# Patient Record
Sex: FEMALE | Race: WHITE | Employment: FULL TIME | URBAN - NONMETROPOLITAN AREA
[De-identification: names, ages, dates, MRNs, and addresses within clinical notes are randomized per-mention and may not be internally consistent; named-entity substitution may affect disease eponyms.]

---

## 2014-11-12 LAB — DIABETIC RETINOPATHY: NORMAL

## 2016-08-18 LAB
CREATININE URINE: NORMAL MG/DL
MICROALBUMIN/CREAT 24H UR: NORMAL MG/G{CREAT}

## 2016-08-19 LAB
CHOLESTEROL, TOTAL: NORMAL MG/DL
CHOLESTEROL/HDL RATIO: NORMAL
HDLC SERPL-MCNC: NORMAL MG/DL (ref 35–70)
LDL CHOLESTEROL CALCULATED: NORMAL MG/DL (ref 0–160)
TRIGL SERPL-MCNC: NORMAL MG/DL
VLDLC SERPL CALC-MCNC: NORMAL MG/DL

## 2017-04-08 LAB
BUN BLDV-MCNC: NORMAL MG/DL
CHLORIDE: NORMAL
CHLORIDE: NORMAL
CREATININE: NORMAL MG/DL
GFR CALCULATED: NORMAL
HBA1C MFR BLD: 8.5 %
POTASSIUM: NORMAL
SODIUM: NORMAL

## 2017-05-04 ENCOUNTER — OFFICE VISIT (OUTPATIENT)
Dept: FAMILY MEDICINE CLINIC | Age: 62
End: 2017-05-04
Payer: COMMERCIAL

## 2017-05-04 VITALS
HEIGHT: 63 IN | BODY MASS INDEX: 32.07 KG/M2 | SYSTOLIC BLOOD PRESSURE: 116 MMHG | DIASTOLIC BLOOD PRESSURE: 58 MMHG | HEART RATE: 64 BPM | WEIGHT: 181 LBS

## 2017-05-04 DIAGNOSIS — I10 ESSENTIAL HYPERTENSION: ICD-10-CM

## 2017-05-04 DIAGNOSIS — E78.6 LIPOPROTEIN DEFICIENCY: ICD-10-CM

## 2017-05-04 DIAGNOSIS — S90.852A FOREIGN BODY IN FOOT, LEFT, INITIAL ENCOUNTER: Primary | ICD-10-CM

## 2017-05-04 DIAGNOSIS — E78.5 HYPERLIPIDEMIA, UNSPECIFIED HYPERLIPIDEMIA TYPE: ICD-10-CM

## 2017-05-04 DIAGNOSIS — N76.0 ACUTE VAGINITIS: ICD-10-CM

## 2017-05-04 PROBLEM — E11.9 DIABETES MELLITUS (HCC): Status: ACTIVE | Noted: 2017-05-04

## 2017-05-04 PROCEDURE — 10120 INC&RMVL FB SUBQ TISS SMPL: CPT | Performed by: FAMILY MEDICINE

## 2017-05-04 PROCEDURE — 99213 OFFICE O/P EST LOW 20 MIN: CPT | Performed by: FAMILY MEDICINE

## 2017-05-04 RX ORDER — SIMVASTATIN 20 MG
20 TABLET ORAL NIGHTLY
COMMUNITY
End: 2017-10-23 | Stop reason: SDUPTHER

## 2017-05-04 RX ORDER — CHOLECALCIFEROL (VITAMIN D3) 125 MCG
500 CAPSULE ORAL DAILY
COMMUNITY

## 2017-05-04 RX ORDER — LISINOPRIL 20 MG/1
20 TABLET ORAL DAILY
COMMUNITY
End: 2017-10-23 | Stop reason: SDUPTHER

## 2017-05-04 RX ORDER — M-VIT,TX,IRON,MINS/CALC/FOLIC 27MG-0.4MG
1 TABLET ORAL DAILY
COMMUNITY
End: 2021-06-03

## 2017-05-08 ENCOUNTER — OFFICE VISIT (OUTPATIENT)
Dept: FAMILY MEDICINE CLINIC | Age: 62
End: 2017-05-08
Payer: COMMERCIAL

## 2017-05-08 VITALS
SYSTOLIC BLOOD PRESSURE: 130 MMHG | DIASTOLIC BLOOD PRESSURE: 80 MMHG | HEIGHT: 63 IN | HEART RATE: 84 BPM | WEIGHT: 178 LBS | BODY MASS INDEX: 31.54 KG/M2

## 2017-05-08 DIAGNOSIS — Z01.419 WELL WOMAN EXAM: Primary | ICD-10-CM

## 2017-05-08 DIAGNOSIS — B37.31 YEAST VAGINITIS: ICD-10-CM

## 2017-05-08 DIAGNOSIS — Z12.39 SCREENING FOR BREAST CANCER: ICD-10-CM

## 2017-05-08 DIAGNOSIS — Z12.11 SCREENING FOR COLON CANCER: ICD-10-CM

## 2017-05-08 DIAGNOSIS — D49.9 NEOPLASM: ICD-10-CM

## 2017-05-08 DIAGNOSIS — L82.1 SEBORRHEIC KERATOSES: ICD-10-CM

## 2017-05-08 DIAGNOSIS — K64.9 HEMORRHOIDS, UNSPECIFIED HEMORRHOID TYPE: ICD-10-CM

## 2017-05-08 PROCEDURE — 99213 OFFICE O/P EST LOW 20 MIN: CPT | Performed by: FAMILY MEDICINE

## 2017-05-08 RX ORDER — FLUCONAZOLE 200 MG/1
200 TABLET ORAL DAILY
Qty: 12 TABLET | Refills: 0 | Status: SHIPPED | OUTPATIENT
Start: 2017-05-08 | End: 2020-11-02

## 2017-05-08 ASSESSMENT — ENCOUNTER SYMPTOMS
COLOR CHANGE: 0
ABDOMINAL PAIN: 0

## 2017-05-09 PROBLEM — L82.1 SEBORRHEIC KERATOSES: Status: ACTIVE | Noted: 2017-05-09

## 2017-06-15 ENCOUNTER — TELEPHONE (OUTPATIENT)
Dept: FAMILY MEDICINE CLINIC | Age: 62
End: 2017-06-15

## 2017-06-15 DIAGNOSIS — Z12.39 SCREENING FOR BREAST CANCER: Primary | ICD-10-CM

## 2017-10-23 RX ORDER — LISINOPRIL 20 MG/1
20 TABLET ORAL DAILY
Qty: 30 TABLET | Refills: 5 | Status: SHIPPED | OUTPATIENT
Start: 2017-10-23 | End: 2017-11-15 | Stop reason: SDUPTHER

## 2017-10-23 RX ORDER — SIMVASTATIN 20 MG
20 TABLET ORAL NIGHTLY
Qty: 30 TABLET | Refills: 5 | Status: SHIPPED | OUTPATIENT
Start: 2017-10-23 | End: 2017-11-15 | Stop reason: SDUPTHER

## 2017-11-11 LAB
AGE FOR GFR: 64
ANION GAP SERPL CALCULATED.3IONS-SCNC: 13 MMOL/L
CHLORIDE BLD-SCNC: 104 MMOL/L
CO2: 28 MMOL/L
CREAT SERPL-MCNC: 0.8 MG/DL
EGFR BF: 87 ML/MIN/1.73 M2
EGFR BM: 118 ML/MIN/1.73 M2
EGFR WF: 72 ML/MIN/1.73 M2
EGFR WM: 97 ML/MIN/1.73 M2
HBA1C MFR BLD: 8 %
POTASSIUM SERPL-SCNC: 4.2 MMOL/L
SODIUM BLD-SCNC: 141 MMOL/L

## 2017-11-15 ENCOUNTER — OFFICE VISIT (OUTPATIENT)
Dept: FAMILY MEDICINE CLINIC | Age: 62
End: 2017-11-15
Payer: COMMERCIAL

## 2017-11-15 VITALS
HEART RATE: 80 BPM | BODY MASS INDEX: 31.18 KG/M2 | WEIGHT: 176 LBS | DIASTOLIC BLOOD PRESSURE: 70 MMHG | SYSTOLIC BLOOD PRESSURE: 110 MMHG

## 2017-11-15 DIAGNOSIS — Z11.59 NEED FOR HEPATITIS C SCREENING TEST: ICD-10-CM

## 2017-11-15 DIAGNOSIS — E11.9 TYPE 2 DIABETES MELLITUS WITHOUT COMPLICATION, WITHOUT LONG-TERM CURRENT USE OF INSULIN (HCC): Primary | ICD-10-CM

## 2017-11-15 DIAGNOSIS — E78.2 MIXED HYPERLIPIDEMIA: ICD-10-CM

## 2017-11-15 DIAGNOSIS — I10 ESSENTIAL HYPERTENSION: ICD-10-CM

## 2017-11-15 PROCEDURE — 99214 OFFICE O/P EST MOD 30 MIN: CPT | Performed by: FAMILY MEDICINE

## 2017-11-15 RX ORDER — SIMVASTATIN 20 MG
20 TABLET ORAL NIGHTLY
Qty: 30 TABLET | Refills: 5 | Status: SHIPPED | OUTPATIENT
Start: 2017-11-15 | End: 2018-03-14 | Stop reason: SDUPTHER

## 2017-11-15 RX ORDER — ACARBOSE 50 MG/1
50 TABLET ORAL
Qty: 90 TABLET | Refills: 5 | Status: SHIPPED | OUTPATIENT
Start: 2017-11-15 | End: 2018-03-14 | Stop reason: SDUPTHER

## 2017-11-15 RX ORDER — LISINOPRIL 20 MG/1
20 TABLET ORAL DAILY
Qty: 30 TABLET | Refills: 5 | Status: SHIPPED | OUTPATIENT
Start: 2017-11-15 | End: 2018-03-14 | Stop reason: SDUPTHER

## 2017-11-15 ASSESSMENT — PATIENT HEALTH QUESTIONNAIRE - PHQ9
SUM OF ALL RESPONSES TO PHQ9 QUESTIONS 1 & 2: 1
1. LITTLE INTEREST OR PLEASURE IN DOING THINGS: 0
SUM OF ALL RESPONSES TO PHQ QUESTIONS 1-9: 1
2. FEELING DOWN, DEPRESSED OR HOPELESS: 1

## 2017-11-15 ASSESSMENT — ENCOUNTER SYMPTOMS: SHORTNESS OF BREATH: 0

## 2017-11-15 NOTE — PROGRESS NOTES
Take 1 tablet by mouth 3 times daily (with meals) 90 tablet 5    Cholecalciferol (VITAMIN D3) 5000 UNITS TABS Take 1 tablet by mouth daily      Multiple Vitamins-Minerals (THERAPEUTIC MULTIVITAMIN-MINERALS) tablet Take 1 tablet by mouth daily      vitamin B-12 (CYANOCOBALAMIN) 500 MCG tablet Take 500 mcg by mouth daily      aspirin 81 MG tablet Take 81 mg by mouth daily       No current facility-administered medications for this visit. Allergies   Allergen Reactions    Advil [Ibuprofen] Nausea Only       Health Maintenance   Topic Date Due    Hepatitis C screen  1955    Diabetic foot exam  05/12/1963    HIV screen  05/12/1968    Colon cancer screen colonoscopy  05/12/2003    Zostavax vaccine  05/12/2013    Diabetic retinal exam  11/12/2015    Diabetic microalbuminuria test  08/18/2017    Lipid screen  08/19/2017    Breast cancer screen  06/21/2018    Diabetic hemoglobin A1C test  11/11/2018    DTaP/Tdap/Td vaccine (2 - Td) 03/15/2020    Cervical cancer screen  05/08/2020    Flu vaccine  Completed    Pneumococcal med risk  Completed       Subjective:      Review of Systems   Constitutional: Negative for chills and unexpected weight change. Eyes: Negative for visual disturbance. Respiratory: Negative for shortness of breath. Cardiovascular: Negative for chest pain, palpitations and leg swelling. Genitourinary: Negative for frequency. Neurological: Negative for dizziness. reviewed colon cancer screening    Blood Sugar Checks? no  Medication Compliant? yes  Blood Pressure Checks? no    Objective:     /70   Pulse 80   Wt 176 lb (79.8 kg)   BMI 31.18 kg/m²   Physical Exam   Constitutional: She is oriented to person, place, and time. She appears well-developed and well-nourished. No distress. HENT:   Head: Atraumatic. Neck: Neck supple. Carotid bruit is not present. No thyromegaly present. Cardiovascular: Normal rate and regular rhythm.     No murmur heard.  Pulmonary/Chest: Effort normal and breath sounds normal.   Neurological: She is alert and oriented to person, place, and time. Lab Results   Component Value Date    LABA1C 8.0 (H) 11/11/2017     No results found for: EAG  Diabetic foot exam - normal strength; intact sensation to 10 gm monofilament; normal pulses, no ulcerations or callouses. Good capillary refill. Toenails unremarkable. Down 2 # , ( plans for more when in [facility])  Assessment:      1. Type 2 diabetes mellitus without complication, without long-term current use of insulin ([address]    2. Essential hypertension    3. Mixed hyperlipidemia    4. Need for hepatitis C screening test                     Plan:     There are no Patient Instructions on file for this visit.   Orders Placed This Encounter   Procedures    Lipid Panel     Standing Status:   Future     Standing Expiration Date:   11/15/2018     Order Specific Question:   Is Patient Fasting?/# of Hours     Answer:   10-12    Creatinine, Serum     Standing Status:   Future     Standing Expiration Date:   11/15/2018    Electrolyte Panel     Standing Status:   Future     Standing Expiration Date:   11/15/2018    Hepatitis C Antibody     Standing Status:   Future     Standing Expiration Date:   11/15/2018    Microalbumin, Ur     Standing Status:   Future     Standing Expiration Date:   11/15/2018     Orders Placed This Encounter   Medications    SITagliptin (JANUVIA) 100 MG tablet     Sig: Take 1 tablet by mouth daily     Dispense:  30 tablet     Refill:  5    simvastatin (ZOCOR) 20 MG tablet     Sig: Take 1 tablet by mouth nightly     Dispense:  30 tablet     Refill:  5    metFORMIN (GLUCOPHAGE) 500 MG tablet     Sig: Take 2 tablets by mouth 2 times daily (with meals)     Dispense:  120 tablet     Refill:  5    lisinopril (PRINIVIL;ZESTRIL) 20 MG tablet     Sig: Take 1 tablet by mouth daily     Dispense:  30 tablet     Refill:  5    dapagliflozin (FARXIGA) 10 MG tablet     Sig: Take 1 tablet by mouth every morning     Dispense:  30 tablet     Refill:  5    acarbose (PRECOSE) 50 MG tablet     Sig: Take 1 tablet by mouth 3 times daily (with meals)     Dispense:  90 tablet     Refill:  5        Return in about 4 months (around 3/15/2018) for DM. Insurance would not cover bydureon for spouse, may consider at follow up          Discussed use, benefit, and side effects of prescribed medications. All patient questions answered. Pt voiced understanding. Reviewed health maintenance. Instructed to continue current medications, diet and exercise. Patient agreed with treatment plan. Follow up as directed.      Electronically signed by Deena Jacome MD on 11/15/2017

## 2018-03-10 LAB
AGE FOR GFR: 64
ANION GAP SERPL CALCULATED.3IONS-SCNC: 19 MMOL/L
CHLORIDE BLD-SCNC: 103 MMOL/L
CHOLESTEROL/HDL RATIO: 3 RATIO
CHOLESTEROL: 176 MG/DL
CO2: 25 MMOL/L
CREAT SERPL-MCNC: 0.8 MG/DL
CREATININE, RANDOM: 30.9 MG/DL
EGFR BF: 87 ML/MIN/1.73 M2
EGFR BM: 118 ML/MIN/1.73 M2
EGFR WF: 72 ML/MIN/1.73 M2
EGFR WM: 97 ML/MIN/1.73 M2
HDL, DIRECT: 58 MG/DL
HEPATITIS C IGG: NORMAL
LDL CHOLESTEROL CALCULATED: 87.8 MG/DL
MICROALBUMIN UR-MCNC: <0.6 MG/DL
POTASSIUM SERPL-SCNC: 4.5 MMOL/L
SIGNAL/CUTOFF: NORMAL
SODIUM BLD-SCNC: 142 MMOL/L
TRIGL SERPL-MCNC: 151 MG/DL
VLDLC SERPL CALC-MCNC: 30 MG/DL

## 2018-03-14 ENCOUNTER — OFFICE VISIT (OUTPATIENT)
Dept: FAMILY MEDICINE CLINIC | Age: 63
End: 2018-03-14
Payer: COMMERCIAL

## 2018-03-14 VITALS
HEIGHT: 63 IN | DIASTOLIC BLOOD PRESSURE: 72 MMHG | SYSTOLIC BLOOD PRESSURE: 138 MMHG | WEIGHT: 179 LBS | BODY MASS INDEX: 31.71 KG/M2 | HEART RATE: 88 BPM

## 2018-03-14 DIAGNOSIS — I10 ESSENTIAL HYPERTENSION: ICD-10-CM

## 2018-03-14 DIAGNOSIS — Z23 NEED FOR SHINGLES VACCINE: ICD-10-CM

## 2018-03-14 DIAGNOSIS — E78.2 MIXED HYPERLIPIDEMIA: ICD-10-CM

## 2018-03-14 DIAGNOSIS — Z12.11 SCREENING FOR COLON CANCER: ICD-10-CM

## 2018-03-14 LAB — HBA1C MFR BLD: 7.4 %

## 2018-03-14 PROCEDURE — 99214 OFFICE O/P EST MOD 30 MIN: CPT | Performed by: FAMILY MEDICINE

## 2018-03-14 PROCEDURE — 83036 HEMOGLOBIN GLYCOSYLATED A1C: CPT | Performed by: FAMILY MEDICINE

## 2018-03-14 RX ORDER — ACARBOSE 50 MG/1
50 TABLET ORAL
Qty: 90 TABLET | Refills: 5 | Status: SHIPPED | OUTPATIENT
Start: 2018-03-14 | End: 2018-10-10 | Stop reason: SDUPTHER

## 2018-03-14 RX ORDER — SIMVASTATIN 20 MG
20 TABLET ORAL NIGHTLY
Qty: 30 TABLET | Refills: 5 | Status: SHIPPED | OUTPATIENT
Start: 2018-03-14 | End: 2018-09-25 | Stop reason: SDUPTHER

## 2018-03-14 RX ORDER — LISINOPRIL 20 MG/1
30 TABLET ORAL DAILY
Qty: 45 TABLET | Refills: 5 | Status: SHIPPED | OUTPATIENT
Start: 2018-03-14 | End: 2018-10-10 | Stop reason: SDUPTHER

## 2018-03-14 ASSESSMENT — ENCOUNTER SYMPTOMS: SHORTNESS OF BREATH: 0

## 2018-03-14 NOTE — PROGRESS NOTES
[facility]  [address]  Dept: [phone]  Dept Fax: [phone]    Cody Castelan is a 57 y.o. female who presents today for her medical conditions/complaints as noted below. Cody Castelan is c/o of 3 Month Follow-Up and Diabetes            HPI:     Diabetes   She presents for her follow-up diabetic visit. She has type 2 diabetes mellitus. Her disease course has been improving. Pertinent negatives for hypoglycemia include no dizziness. Pertinent negatives for diabetes include no chest pain. Current diabetic treatment includes oral agent (triple therapy). She is compliant with treatment all of the time. An ACE inhibitor/angiotensin II receptor blocker is being taken. Here to follow up on DM, not monitoring to closely  Taking medication regularly.   No side effects noted    BP Readings from Last 3 Encounters:   03/14/18 138/72   11/15/17 110/70   05/08/17 130/80          (goal 120/80)    Past Medical History:   Diagnosis Date    Hyperlipidemia     Hypertension     Type 2 diabetes mellitus without complication (HCC)       Past Surgical History:   Procedure Laterality Date    CYST REMOVAL      spinal cyst    KNEE SURGERY Right 02/2017    TUBAL LIGATION         Family History   Problem Relation Age of Onset    Diabetes Mother     Breast Cancer Mother     Cancer Mother 66     breast- , BCC lip-70    High Blood Pressure Mother     Diabetes Father     Heart Attack Father     Heart Attack Paternal Grandmother        Social History   Substance Use Topics    Smoking status: Former Smoker    Smokeless tobacco: Never Used    Alcohol use Not on file      Current Outpatient Prescriptions   Medication Sig Dispense Refill    acarbose (PRECOSE) 50 MG tablet Take 1 tablet by mouth 3 times daily (with meals) 90 tablet 5    dapagliflozin (FARXIGA) 10 MG tablet Take 1 tablet by mouth every morning 30 tablet 5    lisinopril (PRINIVIL;ZESTRIL) 20 Genitourinary: Negative for frequency. Neurological: Negative for dizziness. Blood Sugar Checks? no  Medication Compliant? yes  Blood Pressure Checks? No just on occassion    Objective:     /72   Pulse 88   Ht 5' 3\" (1.6 m)   Wt 179 lb (81.2 kg)   BMI 31.71 kg/m²   Physical Exam   Constitutional: She is oriented to person, place, and time. She appears well-developed and well-nourished. No distress. HENT:   Head: Atraumatic. Neck: Neck supple. Carotid bruit is not present. No thyromegaly present. Cardiovascular: Normal rate and regular rhythm. No murmur heard. Pulmonary/Chest: Effort normal and breath sounds normal.   Neurological: She is alert and oriented to person, place, and time. Lab Results   Component Value Date    LABA1C 7.4 03/14/2018     No results found for: EAG  Wt up 2 #   Reviewed Hep C negative  Lab Results   Component Value Date    CHOL 176 03/10/2018     Lab Results   Component Value Date    TRIG 151 03/10/2018     No results found for: HDL  Lab Results   Component Value Date    LDLCALC 87.8 03/10/2018     Lab Results   Component Value Date    VLDL 30 03/10/2018     Lab Results   Component Value Date    CHOLHDLRATIO 3.0 03/10/2018     The 10-year ASCVD risk score (Aston Gunn et al., 2013) is: 13.2%    Values used to calculate the score:      Age: 57 years      Sex: Female      Is Non- : No      Diabetic: Yes      Tobacco smoker: No      Systolic Blood Pressure: 335 mmHg      Is BP treated: Yes      HDL Cholesterol: 58 mg/dL      Total Cholesterol: 176 mg/dL  Lab Results   Component Value Date    LABA1C 7.4 03/14/2018     No results found for: EAG    Assessment:      1. Uncontrolled type 2 diabetes mellitus without complication, without long-term current use of insulin ([address]    2. Screening for colon cancer    3. Need for shingles vaccine    4. Essential hypertension    5.  Mixed hyperlipidemia                     Plan:     There are no Patient Instructions on file for this visit. Orders Placed This Encounter   Procedures    Amb External Referral To General Surgery     Referral Priority:   Routine     Referral Type:   Consult for Advice and Opinion     Referral Reason:   Specialty Services Required     Referred to Provider:   Shayan Bee MD     Requested Specialty:   General Surgery     Number of Visits Requested:   1    POCT glycosylated hemoglobin (Hb A1C)     Orders Placed This Encounter   Medications    acarbose (PRECOSE) 50 MG tablet     Sig: Take 1 tablet by mouth 3 times daily (with meals)     Dispense:  90 tablet     Refill:  5    dapagliflozin (FARXIGA) 10 MG tablet     Sig: Take 1 tablet by mouth every morning     Dispense:  30 tablet     Refill:  5    lisinopril (PRINIVIL;ZESTRIL) 20 MG tablet     Sig: Take 1.5 tablets by mouth daily     Dispense:  45 tablet     Refill:  5    metFORMIN (GLUCOPHAGE) 500 MG tablet     Sig: Take 2 tablets by mouth 2 times daily (with meals)     Dispense:  120 tablet     Refill:  5    simvastatin (ZOCOR) 20 MG tablet     Sig: Take 1 tablet by mouth nightly     Dispense:  30 tablet     Refill:  5    SITagliptin (JANUVIA) 100 MG tablet     Sig: Take 1 tablet by mouth daily     Dispense:  30 tablet     Refill:  5    zoster recombinant adjuvanted vaccine (SHINGRIX) 50 MCG SUSR injection     Sig: Inject 0.5 mLs into the muscle once for 1 dose     Dispense:  1 each     Refill:  0        Return in about 4 months (around 7/14/2018). Need for optho appt. Discussed use, benefit, and side effects of prescribed medications. All patient questions answered. Pt voiced understanding. Reviewed health maintenance reviewed shingrix, colonoscopy desired. Instructed to continue current medications, diet and exercise. Patient agreed with treatment plan. Follow up as directed.      Electronically signed by Yanci Peralta MD on 3/14/2018

## 2018-09-12 ENCOUNTER — TELEPHONE (OUTPATIENT)
Dept: FAMILY MEDICINE CLINIC | Age: 63
End: 2018-09-12

## 2018-09-17 ENCOUNTER — OFFICE VISIT (OUTPATIENT)
Dept: FAMILY MEDICINE CLINIC | Age: 63
End: 2018-09-17
Payer: MEDICARE

## 2018-09-17 VITALS
BODY MASS INDEX: 31.18 KG/M2 | WEIGHT: 176 LBS | DIASTOLIC BLOOD PRESSURE: 90 MMHG | HEART RATE: 80 BPM | SYSTOLIC BLOOD PRESSURE: 130 MMHG

## 2018-09-17 DIAGNOSIS — E11.9 TYPE 2 DIABETES MELLITUS WITHOUT COMPLICATION, WITHOUT LONG-TERM CURRENT USE OF INSULIN (HCC): Primary | ICD-10-CM

## 2018-09-17 DIAGNOSIS — Z12.11 COLON CANCER SCREENING: ICD-10-CM

## 2018-09-17 DIAGNOSIS — Z23 NEED FOR VACCINATION: ICD-10-CM

## 2018-09-17 DIAGNOSIS — E78.6 LIPOPROTEIN DEFICIENCY: ICD-10-CM

## 2018-09-17 DIAGNOSIS — E78.2 MIXED HYPERLIPIDEMIA: ICD-10-CM

## 2018-09-17 DIAGNOSIS — Z23 NEED FOR PNEUMOCOCCAL VACCINATION: ICD-10-CM

## 2018-09-17 DIAGNOSIS — I10 ESSENTIAL HYPERTENSION: ICD-10-CM

## 2018-09-17 LAB — HBA1C MFR BLD: 8.6 %

## 2018-09-17 PROCEDURE — G0009 ADMIN PNEUMOCOCCAL VACCINE: HCPCS | Performed by: FAMILY MEDICINE

## 2018-09-17 PROCEDURE — 99214 OFFICE O/P EST MOD 30 MIN: CPT | Performed by: FAMILY MEDICINE

## 2018-09-17 PROCEDURE — 1123F ACP DISCUSS/DSCN MKR DOCD: CPT | Performed by: FAMILY MEDICINE

## 2018-09-17 PROCEDURE — G8427 DOCREV CUR MEDS BY ELIG CLIN: HCPCS | Performed by: FAMILY MEDICINE

## 2018-09-17 PROCEDURE — 4040F PNEUMOC VAC/ADMIN/RCVD: CPT | Performed by: FAMILY MEDICINE

## 2018-09-17 PROCEDURE — 1090F PRES/ABSN URINE INCON ASSESS: CPT | Performed by: FAMILY MEDICINE

## 2018-09-17 PROCEDURE — 3045F PR MOST RECENT HEMOGLOBIN A1C LEVEL 7.0-9.0%: CPT | Performed by: FAMILY MEDICINE

## 2018-09-17 PROCEDURE — 1036F TOBACCO NON-USER: CPT | Performed by: FAMILY MEDICINE

## 2018-09-17 PROCEDURE — 3017F COLORECTAL CA SCREEN DOC REV: CPT | Performed by: FAMILY MEDICINE

## 2018-09-17 PROCEDURE — 83036 HEMOGLOBIN GLYCOSYLATED A1C: CPT | Performed by: FAMILY MEDICINE

## 2018-09-17 PROCEDURE — 90662 IIV NO PRSV INCREASED AG IM: CPT | Performed by: FAMILY MEDICINE

## 2018-09-17 PROCEDURE — G0008 ADMIN INFLUENZA VIRUS VAC: HCPCS | Performed by: FAMILY MEDICINE

## 2018-09-17 PROCEDURE — 1101F PT FALLS ASSESS-DOCD LE1/YR: CPT | Performed by: FAMILY MEDICINE

## 2018-09-17 PROCEDURE — 2022F DILAT RTA XM EVC RTNOPTHY: CPT | Performed by: FAMILY MEDICINE

## 2018-09-17 PROCEDURE — G8417 CALC BMI ABV UP PARAM F/U: HCPCS | Performed by: FAMILY MEDICINE

## 2018-09-17 PROCEDURE — G8400 PT W/DXA NO RESULTS DOC: HCPCS | Performed by: FAMILY MEDICINE

## 2018-09-17 PROCEDURE — 90670 PCV13 VACCINE IM: CPT | Performed by: FAMILY MEDICINE

## 2018-09-17 ASSESSMENT — PATIENT HEALTH QUESTIONNAIRE - PHQ9
1. LITTLE INTEREST OR PLEASURE IN DOING THINGS: 0
SUM OF ALL RESPONSES TO PHQ9 QUESTIONS 1 & 2: 0
2. FEELING DOWN, DEPRESSED OR HOPELESS: 0
SUM OF ALL RESPONSES TO PHQ QUESTIONS 1-9: 0
SUM OF ALL RESPONSES TO PHQ QUESTIONS 1-9: 0

## 2018-09-17 ASSESSMENT — ENCOUNTER SYMPTOMS: SHORTNESS OF BREATH: 0

## 2018-09-17 NOTE — PROGRESS NOTES
tablet Take 1.5 tablets by mouth daily 45 tablet 5    simvastatin (ZOCOR) 20 MG tablet Take 1 tablet by mouth nightly 30 tablet 5    Doxylamine Succinate, Sleep, (SLEEP AID PO) Take 1 tablet by mouth nightly as needed      Cholecalciferol (VITAMIN D3) 5000 UNITS TABS Take 1 tablet by mouth daily      Multiple Vitamins-Minerals (THERAPEUTIC MULTIVITAMIN-MINERALS) tablet Take 1 tablet by mouth daily      vitamin B-12 (CYANOCOBALAMIN) 500 MCG tablet Take 500 mcg by mouth daily      aspirin 81 MG tablet Take 81 mg by mouth daily       No current facility-administered medications for this visit. Allergies   Allergen Reactions    Advil [Ibuprofen] Nausea Only       Health Maintenance   Topic Date Due    HIV screen  05/12/1968    Shingles Vaccine (1 of 2 - 2 Dose Series) 05/12/2003    Colon cancer screen colonoscopy  05/12/2003    Diabetic retinal exam  11/12/2015    DEXA (modify frequency per FRAX score)  05/12/2018    Breast cancer screen  06/21/2018    Diabetic foot exam  11/15/2018    Diabetic microalbuminuria test  03/10/2019    Lipid screen  03/10/2019    Potassium monitoring  03/10/2019    Creatinine monitoring  03/10/2019    A1C test (Diabetic or Prediabetic)  09/17/2019    Pneumococcal low/med risk (2 of 2 - PPSV23) 09/17/2019    DTaP/Tdap/Td vaccine (2 - Td) 03/15/2020    Cervical cancer screen  05/08/2020    Flu vaccine  Completed    Hepatitis C screen  Completed       Subjective:      Review of Systems   Constitutional: Negative for chills and unexpected weight change. Eyes: Positive for visual disturbance (going to eye phys). Respiratory: Negative for shortness of breath. Cardiovascular: Negative for chest pain, palpitations and leg swelling. Genitourinary: Negative for frequency. Neurological: Negative for dizziness. Blood Sugar Checks? Yes AM is always high 165-170  Medication Compliant? yes  Blood Pressure Checks?  Yes at times    Objective:     BP (!) 130/90 Pulse 80   Wt 176 lb (79.8 kg)   BMI 31.18 kg/m²   Physical Exam   Constitutional: She is oriented to person, place, and time. She appears well-developed and well-nourished. No distress. HENT:   Head: Atraumatic. Neck: Neck supple. Carotid bruit is not present. No thyromegaly present. Cardiovascular: Normal rate and regular rhythm. No murmur heard. Pulmonary/Chest: Effort normal and breath sounds normal.   Musculoskeletal: She exhibits no edema. Neurological: She is alert and oriented to person, place, and time. Lab Results   Component Value Date    LABA1C 8.6 09/17/2018     No results found for: EAG  A1C 8.6 up from 7.4 when on both januvia and farxiga      Assessment:      1. Type 2 diabetes mellitus without complication, without long-term current use of insulin ([facility] [address]    2. Need for vaccination    3. Need for pneumococcal vaccination    4. Essential hypertension    5. Mixed hyperlipidemia    6. Lipoprotein deficiency    7. Colon cancer screening                     Plan:     Patient Instructions   Should be using farxiga and either tradjenta or [place] for sugar control    Orders Placed This Encounter   Procedures    INFLUENZA, HIGH DOSE, 65 YRS +, IM, PF, PREFILL SYR, 0.5ML (FLUZONE HD)    Pneumococcal conjugate vaccine 13-valent    Flor Davila DO, General Surgery [place]     Referral Priority:   Routine     Referral Type:   Eval and Treat     Referral Reason:   Specialty Services Required     Referred to Provider:   Jason Claire DO     Requested Specialty:   General Surgery     Number of Visits Requested:   1    POCT glycosylated hemoglobin (Hb A1C)     Orders Placed This Encounter   Medications    linagliptin (TRADJENTA) 5 MG tablet     Sig: Take 1 tablet by mouth daily     Dispense:  30 tablet     Refill:  5        Return in about 4 months (around 1/17/2019) for DM, HTN.     Pt reports plans to see optho prior to November          Discussed use, benefit, and side effects of prescribed medications. All patient questions answered. Pt voiced understanding. Reviewed health maintenance recommend prevnar -13 reviewed with pt. SHingrix reviewed . Reviewed shingrix and colon cancer screening- referral made. Instructed to continue current medications, diet and exercise. Patient agreed with treatment plan. Follow up as directed.      Electronically signed by Homer Gray MD on 9/17/2018

## 2018-09-25 RX ORDER — SIMVASTATIN 20 MG
20 TABLET ORAL NIGHTLY
Qty: 90 TABLET | Refills: 2 | Status: SHIPPED | OUTPATIENT
Start: 2018-09-25 | End: 2018-10-10 | Stop reason: SDUPTHER

## 2018-09-27 ENCOUNTER — OFFICE VISIT (OUTPATIENT)
Dept: SURGERY | Age: 63
End: 2018-09-27

## 2018-09-27 ENCOUNTER — TELEPHONE (OUTPATIENT)
Dept: SURGERY | Age: 63
End: 2018-09-27

## 2018-09-27 VITALS
SYSTOLIC BLOOD PRESSURE: 120 MMHG | BODY MASS INDEX: 31.43 KG/M2 | HEIGHT: 63 IN | WEIGHT: 177.4 LBS | DIASTOLIC BLOOD PRESSURE: 84 MMHG | HEART RATE: 79 BPM | TEMPERATURE: 98.1 F

## 2018-09-27 DIAGNOSIS — Z12.11 SCREENING FOR COLON CANCER: Primary | ICD-10-CM

## 2018-09-27 PROCEDURE — 99999 PR OFFICE/OUTPT VISIT,PROCEDURE ONLY: CPT | Performed by: SURGERY

## 2018-09-27 PROCEDURE — 1123F ACP DISCUSS/DSCN MKR DOCD: CPT | Performed by: SURGERY

## 2018-09-27 PROCEDURE — 1036F TOBACCO NON-USER: CPT | Performed by: SURGERY

## 2018-09-27 PROCEDURE — 3017F COLORECTAL CA SCREEN DOC REV: CPT | Performed by: SURGERY

## 2018-09-27 PROCEDURE — 1101F PT FALLS ASSESS-DOCD LE1/YR: CPT | Performed by: SURGERY

## 2018-09-27 PROCEDURE — G8400 PT W/DXA NO RESULTS DOC: HCPCS | Performed by: SURGERY

## 2018-09-27 PROCEDURE — G8417 CALC BMI ABV UP PARAM F/U: HCPCS | Performed by: SURGERY

## 2018-09-27 PROCEDURE — G8427 DOCREV CUR MEDS BY ELIG CLIN: HCPCS | Performed by: SURGERY

## 2018-09-27 PROCEDURE — 4040F PNEUMOC VAC/ADMIN/RCVD: CPT | Performed by: SURGERY

## 2018-09-27 PROCEDURE — 1090F PRES/ABSN URINE INCON ASSESS: CPT | Performed by: SURGERY

## 2018-09-27 NOTE — PATIENT INSTRUCTIONS
Patient Education        Learning About Colonoscopy  What is a colonoscopy? A colonoscopy is a test (also called a procedure) that lets a doctor look inside your large intestine. The doctor uses a thin, lighted tube called a colonoscope. The doctor uses it to look for small growths called polyps, colon or rectal cancer (colorectal cancer), or other problems like bleeding. During the procedure, the doctor can take samples of tissue. The samples can then be checked for cancer or other conditions. The doctor can also take out polyps. How is colonoscopy done? This procedure is done in a doctor's office or a clinic or hospital. You will get medicine to help you relax and not feel pain. Some people find that they do not remember having the test because of the medicine. The doctor gently moves the colonoscope, or scope, through the colon. The scope is also a small video camera. It lets the doctor see the colon and take pictures. A colonoscopy usually takes 30 to 45 minutes. It may take longer if the doctor has to remove polyps. How do you prepare for the procedure? You need to clean out your colon before the procedure so the doctor can see all of your colon. You may start the cleaning process a day or two before the test. This depends on which \"colon prep\" your doctor recommends. To clean your colon, you stop eating solid foods and drink only clear liquids. You can have water, tea, coffee, clear juices, clear broths, flavored ice pops, and gelatin (such as Jell-O). Do not drink anything red or purple, such as grape juice or fruit punch. And do not eat red or purple foods, such as grape ice pops or cherry gelatin. The day or night before the procedure, you drink a large amount of a special liquid. This causes loose, frequent stools. You will go to the bathroom a lot. It is very important to drink all of the colon prep liquid. If you have problems drinking the liquid, call your doctor.   For many people, the prep is worse than the test. It may be uncomfortable, and you may feel hungry on the clear liquid diet. Some people do not go to work or do their usual activities on the day of the prep. Arrange to have someone take you home after the test.  What can you expect after a colonoscopy? The nurses will watch you for 1 to 2 hours until the medicines wear off. Then you can go home. You will need a ride. Your doctor will tell you when you can eat and do your usual activities. Your doctor will talk to you about when you will need your next colonoscopy. The results of your test and your risk for colorectal cancer will help your doctor decide how often you need to be checked. Follow-up care is a key part of your treatment and safety. Be sure to make and go to all appointments, and call your doctor if you are having problems. It's also a good idea to know your test results and keep a list of the medicines you take. Where can you learn more? Go to https://"[employer]. org and sign in to your [employer] account. Enter E250 in the [employer] box to learn more about \"Learning About Colonoscopy. \"     If you do not have an account, please click on the \"Sign Up Now\" link. Current as of: May 12, 2017  Content Version: 11.7  © 0210-8638 [employer]. Care instructions adapted under license by [facility] ([facility]). If you have questions about a medical condition or this instruction, always ask your healthcare professional. [place] [address] any warranty or liability for your use of this information.

## 2018-09-27 NOTE — PROGRESS NOTES
Colonoscopy:                   Screen-Yes     Diagnostic-No  Abdominal Pain-No              Melena-No  Anemia-No                           Hematochezia-No  Bloating-No                          Rectal Bleeding-No  Diarrhea-No                         Rectal/Anal Pain-No  Constipation-No                   Pruritis-No  Family History colon cancer-No  Previous Colon Cancer/Polyp-No  Change in Bowels-No  Decrease Caliber of Stool-No  Change in Color of Stool-No

## 2018-10-01 LAB — PATHOLOGY REPORT: NORMAL

## 2018-10-10 RX ORDER — SIMVASTATIN 20 MG
20 TABLET ORAL NIGHTLY
Qty: 90 TABLET | Refills: 1 | Status: SHIPPED | OUTPATIENT
Start: 2018-10-10 | End: 2019-02-08 | Stop reason: SDUPTHER

## 2018-10-10 RX ORDER — ACARBOSE 50 MG/1
50 TABLET ORAL
Qty: 270 TABLET | Refills: 1 | Status: SHIPPED | OUTPATIENT
Start: 2018-10-10 | End: 2019-02-08 | Stop reason: SDUPTHER

## 2018-10-10 RX ORDER — LISINOPRIL 20 MG/1
30 TABLET ORAL DAILY
Qty: 135 TABLET | Refills: 1 | Status: SHIPPED | OUTPATIENT
Start: 2018-10-10 | End: 2019-02-08 | Stop reason: SDUPTHER

## 2018-10-24 ENCOUNTER — TELEPHONE (OUTPATIENT)
Dept: FAMILY MEDICINE CLINIC | Age: 63
End: 2018-10-24

## 2018-10-24 NOTE — TELEPHONE ENCOUNTER
Pt is taking Maricel [place] and is experiencing yeast infections with it. She would like to know if there is anything that could be called in to help this? Her phone number is [phone].

## 2018-11-20 NOTE — TELEPHONE ENCOUNTER
Cody Zepeda is calling to request a refill on the following medication(s):  Requested Prescriptions     Pending Prescriptions Disp Refills    Exenatide (BYDUREON) 2 MG PEN 12 pen 5     Sig: Inject 1 pen into the skin once a week       Last Visit Date (If Applicable):  3/05/2190    Next Visit Date:    Visit date not found    Fax states same or cheaper for 90 day supply

## 2018-12-04 ENCOUNTER — TELEPHONE (OUTPATIENT)
Dept: FAMILY MEDICINE CLINIC | Age: 63
End: 2018-12-04

## 2019-02-06 ENCOUNTER — TELEPHONE (OUTPATIENT)
Dept: FAMILY MEDICINE CLINIC | Age: 64
End: 2019-02-06

## 2019-02-08 RX ORDER — SIMVASTATIN 20 MG
20 TABLET ORAL NIGHTLY
Qty: 90 TABLET | Refills: 1 | Status: SHIPPED | OUTPATIENT
Start: 2019-02-08 | End: 2019-03-07 | Stop reason: SDUPTHER

## 2019-02-08 RX ORDER — LISINOPRIL 20 MG/1
30 TABLET ORAL DAILY
Qty: 135 TABLET | Refills: 1 | Status: SHIPPED | OUTPATIENT
Start: 2019-02-08 | End: 2019-03-07 | Stop reason: SDUPTHER

## 2019-02-08 RX ORDER — ACARBOSE 50 MG/1
50 TABLET ORAL
Qty: 270 TABLET | Refills: 1 | Status: SHIPPED | OUTPATIENT
Start: 2019-02-08 | End: 2019-03-07 | Stop reason: SDUPTHER

## 2019-03-07 RX ORDER — LISINOPRIL 20 MG/1
30 TABLET ORAL DAILY
Qty: 135 TABLET | Refills: 0 | Status: SHIPPED | OUTPATIENT
Start: 2019-03-07 | End: 2019-04-09 | Stop reason: SDUPTHER

## 2019-03-07 RX ORDER — SIMVASTATIN 20 MG
20 TABLET ORAL NIGHTLY
Qty: 90 TABLET | Refills: 0 | Status: SHIPPED | OUTPATIENT
Start: 2019-03-07 | End: 2019-04-29 | Stop reason: SDUPTHER

## 2019-03-07 RX ORDER — ACARBOSE 50 MG/1
50 TABLET ORAL
Qty: 270 TABLET | Refills: 0 | Status: SHIPPED | OUTPATIENT
Start: 2019-03-07 | End: 2019-04-29 | Stop reason: SDUPTHER

## 2019-03-27 ENCOUNTER — TELEPHONE (OUTPATIENT)
Dept: FAMILY MEDICINE CLINIC | Age: 64
End: 2019-03-27

## 2019-04-01 NOTE — TELEPHONE ENCOUNTER
Please ensure He is also taking her Trulicity. It appears we need a follow up appointment to discuss other treatment options including possible basal insulin treatment, if the Trulicity is being used also.   Thanks

## 2019-04-03 NOTE — TELEPHONE ENCOUNTER
Pt called back josh she is not taking the trulicty because it leaves bumps after she takes it so she is not taking it and said she was doing fine on the 100mg of januvia without trulicity. Pt is in [place] and made an apt for 4/29/19.  We can call her back at [phone]

## 2019-04-10 RX ORDER — LISINOPRIL 20 MG/1
30 TABLET ORAL DAILY
Qty: 135 TABLET | Refills: 0 | Status: SHIPPED | OUTPATIENT
Start: 2019-04-10 | End: 2019-11-04 | Stop reason: SDUPTHER

## 2019-04-10 NOTE — TELEPHONE ENCOUNTER
Arthur  is calling to request a refill on the following medication(s):  Requested Prescriptions     Pending Prescriptions Disp Refills    lisinopril (PRINIVIL;ZESTRIL) 20 MG tablet [Pharmacy Med Name: Lisinopril 20 MG Oral Tablet] 135 tablet 0     Sig: Take 1.5 tablets by mouth daily       Last Visit Date (If Applicable):  8/31/0768    Next Visit Date:    4/29/2019

## 2019-04-29 ENCOUNTER — OFFICE VISIT (OUTPATIENT)
Dept: FAMILY MEDICINE CLINIC | Age: 64
End: 2019-04-29
Payer: MEDICARE

## 2019-04-29 VITALS
SYSTOLIC BLOOD PRESSURE: 136 MMHG | WEIGHT: 176 LBS | OXYGEN SATURATION: 98 % | HEIGHT: 63 IN | DIASTOLIC BLOOD PRESSURE: 72 MMHG | BODY MASS INDEX: 31.18 KG/M2 | HEART RATE: 72 BPM

## 2019-04-29 DIAGNOSIS — I10 ESSENTIAL HYPERTENSION: ICD-10-CM

## 2019-04-29 DIAGNOSIS — Z78.0 POSTMENOPAUSAL: ICD-10-CM

## 2019-04-29 DIAGNOSIS — E78.2 MIXED HYPERLIPIDEMIA: ICD-10-CM

## 2019-04-29 DIAGNOSIS — E11.9 TYPE 2 DIABETES MELLITUS WITHOUT COMPLICATION, WITHOUT LONG-TERM CURRENT USE OF INSULIN (HCC): ICD-10-CM

## 2019-04-29 DIAGNOSIS — Z12.31 SCREENING MAMMOGRAM, ENCOUNTER FOR: ICD-10-CM

## 2019-04-29 LAB — HBA1C MFR BLD: 7.4 %

## 2019-04-29 PROCEDURE — 99214 OFFICE O/P EST MOD 30 MIN: CPT | Performed by: FAMILY MEDICINE

## 2019-04-29 PROCEDURE — 3017F COLORECTAL CA SCREEN DOC REV: CPT | Performed by: FAMILY MEDICINE

## 2019-04-29 PROCEDURE — 4040F PNEUMOC VAC/ADMIN/RCVD: CPT | Performed by: FAMILY MEDICINE

## 2019-04-29 PROCEDURE — 1036F TOBACCO NON-USER: CPT | Performed by: FAMILY MEDICINE

## 2019-04-29 PROCEDURE — 83036 HEMOGLOBIN GLYCOSYLATED A1C: CPT | Performed by: FAMILY MEDICINE

## 2019-04-29 PROCEDURE — 2022F DILAT RTA XM EVC RTNOPTHY: CPT | Performed by: FAMILY MEDICINE

## 2019-04-29 PROCEDURE — 1123F ACP DISCUSS/DSCN MKR DOCD: CPT | Performed by: FAMILY MEDICINE

## 2019-04-29 PROCEDURE — 1090F PRES/ABSN URINE INCON ASSESS: CPT | Performed by: FAMILY MEDICINE

## 2019-04-29 PROCEDURE — G8417 CALC BMI ABV UP PARAM F/U: HCPCS | Performed by: FAMILY MEDICINE

## 2019-04-29 PROCEDURE — G8427 DOCREV CUR MEDS BY ELIG CLIN: HCPCS | Performed by: FAMILY MEDICINE

## 2019-04-29 PROCEDURE — G8400 PT W/DXA NO RESULTS DOC: HCPCS | Performed by: FAMILY MEDICINE

## 2019-04-29 PROCEDURE — 3045F PR MOST RECENT HEMOGLOBIN A1C LEVEL 7.0-9.0%: CPT | Performed by: FAMILY MEDICINE

## 2019-04-29 RX ORDER — ACARBOSE 50 MG/1
50 TABLET ORAL
Qty: 270 TABLET | Refills: 1 | Status: SHIPPED | OUTPATIENT
Start: 2019-04-29 | End: 2019-08-19 | Stop reason: SDUPTHER

## 2019-04-29 RX ORDER — SIMVASTATIN 20 MG
20 TABLET ORAL NIGHTLY
Qty: 90 TABLET | Refills: 0 | Status: SHIPPED | OUTPATIENT
Start: 2019-04-29 | End: 2019-11-04 | Stop reason: SDUPTHER

## 2019-04-29 ASSESSMENT — PATIENT HEALTH QUESTIONNAIRE - PHQ9
SUM OF ALL RESPONSES TO PHQ QUESTIONS 1-9: 0
1. LITTLE INTEREST OR PLEASURE IN DOING THINGS: 0
SUM OF ALL RESPONSES TO PHQ QUESTIONS 1-9: 0
2. FEELING DOWN, DEPRESSED OR HOPELESS: 0
SUM OF ALL RESPONSES TO PHQ9 QUESTIONS 1 & 2: 0

## 2019-04-29 ASSESSMENT — ENCOUNTER SYMPTOMS: SHORTNESS OF BREATH: 0

## 2019-04-29 NOTE — PROGRESS NOTES
[address] [address]  Dept: [phone]  Dept Fax: [phone]    Arabella Ruffin is a 70 y.o. female who presents today for her medical conditions/complaints as noted below. Arabella Ruffin is c/o of 6 Month Follow-Up; Hypertension; and Diabetes      HPI:     HPI  Here for follow up of HTN, DM and Hyperlipidemia  Taking all medications regularly  No side effects noted  No lows noted  Better on Januvia per pt than Tradjenta.     No other complaint currently    BP Readings from Last 3 Encounters:   19 136/72   18 120/84   18 (!) 130/90          (goal 120/80)    Past Medical History:   Diagnosis Date    Hyperlipidemia     Hypertension     Type 2 diabetes mellitus without complication (HCC)       Past Surgical History:   Procedure Laterality Date    CYST REMOVAL      spinal cyst    KNEE SURGERY Right 2017    TUBAL LIGATION         Family History   Problem Relation Age of Onset    Diabetes Mother     Breast Cancer Mother     Cancer Mother 66        breast- , BCC lip-70    High Blood Pressure Mother     Diabetes Father     Heart Attack Father     Heart Attack Paternal Grandmother        Social History     Tobacco Use    Smoking status: Former Smoker     Packs/day: 0.25     Years: 5.00     Pack years: 1.25     Types: Cigarettes     Last attempt to quit: 1977     Years since quittin.3    Smokeless tobacco: Never Used   Substance Use Topics    Alcohol use: No      Current Outpatient Medications   Medication Sig Dispense Refill    metFORMIN (GLUCOPHAGE) 500 MG tablet Take 2 tablets by mouth 2 times daily (with meals) 360 tablet 1    acarbose (PRECOSE) 50 MG tablet Take 1 tablet by mouth 3 times daily (with meals) 270 tablet 1    lisinopril (PRINIVIL;ZESTRIL) 20 MG tablet Take 1.5 tablets by mouth daily 135 tablet 0    bisacodyl (BISACODYL) 5 MG EC tablet Take 1 tablet by mouth See Admin Instructions 2 tablet 0    Cholecalciferol (VITAMIN D3) 5000 UNITS TABS Take 1 tablet by mouth daily      Multiple Vitamins-Minerals (THERAPEUTIC MULTIVITAMIN-MINERALS) tablet Take 1 tablet by mouth daily      vitamin B-12 (CYANOCOBALAMIN) 500 MCG tablet Take 500 mcg by mouth daily      aspirin 81 MG tablet Take 81 mg by mouth daily      simvastatin (ZOCOR) 20 MG tablet Take 1 tablet by mouth nightly 90 tablet 0    SITagliptin (JANUVIA) 100 MG tablet Take 1 tablet by mouth daily 90 tablet 1     No current facility-administered medications for this visit. Allergies   Allergen Reactions    Advil [Ibuprofen] Nausea Only    Farxiga [Dapagliflozin] Rash     Yeast infections commonly occuring       Health Maintenance   Topic Date Due    HIV screen  05/12/1970    Shingles Vaccine (1 of 2) 05/12/2003    DEXA (modify frequency per FRAX score)  05/12/2018    Breast cancer screen  06/21/2018    Diabetic foot exam  11/15/2018    Diabetic microalbuminuria test  03/10/2019    Lipid screen  03/10/2019    Potassium monitoring  03/10/2019    Creatinine monitoring  03/10/2019    A1C test (Diabetic or Prediabetic)  09/17/2019    Pneumococcal 65+ years Vaccine (2 of 2 - PPSV23) 09/17/2019    Diabetic retinal exam  10/02/2019    DTaP/Tdap/Td vaccine (2 - Td) 03/15/2020    Cervical cancer screen  05/08/2020    Colon cancer screen colonoscopy  10/02/2028    Flu vaccine  Completed    Hepatitis C screen  Completed       Subjective:      Review of Systems   Constitutional: Negative for chills and unexpected weight change. Here for routine follow up with questions about changing Januvia to Tradjenta   Eyes: Negative for visual disturbance. Respiratory: Negative for shortness of breath. Cardiovascular: Negative for chest pain, palpitations and leg swelling. Genitourinary: Negative for frequency. Neurological: Negative for dizziness. Mole on back to remove  Using CBD oil on bottom of foot.  No longer on sleep med now using    Blood Sugar Checks? Yes, every day and has been running in the 200's since stopping Januvia and starting Tradjenta  Medication Compliant? Yes  Blood Pressure Checks? Yes, at times    Objective:     /72   Pulse 72   Ht 5' 3\" (1.6 m)   Wt 176 lb (79.8 kg)   SpO2 98%   BMI 31.18 kg/m²   Physical Exam   Constitutional: She is oriented to person, place, and time. She appears well-developed and well-nourished. No distress. HENT:   Head: Atraumatic. Neck: Neck supple. Carotid bruit is not present. No thyromegaly present. Cardiovascular: Normal rate and regular rhythm. No murmur heard. Pulmonary/Chest: Effort normal and breath sounds normal.   Abdominal: Soft. Bowel sounds are normal. She exhibits no mass. Musculoskeletal: She exhibits no edema. Neurological: She is alert and oriented to person, place, and time. Skin:   Right lower outer thigh with 5 mm firm nodule  Multiple Seb K's on back  5 mm x 15 mm lesion upper central back possible 2 seb k's over benign spot. Psychiatric: She has a normal mood and affect. Vitals reviewed. Diabetic foot exam - normal strength; intact sensation to 10 gm monofilament slight drop left great toe; normal pulses, no ulcerations or callouses. Good capillary refill. Toenails unremarkable. Lab Results   Component Value Date    LABA1C 7.4 04/29/2019     No results found for: EAG  A1C down now 7.4 from prior 8.6    Wt down 3 # in past year now back to prior low  Assessment:      1. Uncontrolled type 2 diabetes mellitus without complication, without long-term current use of insulin ([address]    2. Type 2 diabetes mellitus without complication, without long-term current use of insulin ([address]    3. Mixed hyperlipidemia    4. Essential hypertension    5. Screening mammogram, encounter for    6. Postmenopausal                     Plan:     There are no Patient Instructions on file for this visit.   Orders Placed This Encounter   Procedures    DANYELLE DIGITAL SCREEN W CAD BILATERAL     Standing Status:   Future     Standing Expiration Date:   4/29/2020     Order Specific Question:   Reason for exam:     Answer:   screening    DEXA Bone Density 2 Sites     Standing Status:   Future     Standing Expiration Date:   6/29/2019    Electrolyte Panel     Standing Status:   Future     Standing Expiration Date:   4/28/2020    Creatinine, Serum     Standing Status:   Future     Standing Expiration Date:   4/28/2020    POCT glycosylated hemoglobin (Hb A1C)    HM DIABETES FOOT EXAM     Orders Placed This Encounter   Medications    metFORMIN (GLUCOPHAGE) 500 MG tablet     Sig: Take 2 tablets by mouth 2 times daily (with meals)     Dispense:  360 tablet     Refill:  1    acarbose (PRECOSE) 50 MG tablet     Sig: Take 1 tablet by mouth 3 times daily (with meals)     Dispense:  270 tablet     Refill:  1    DISCONTD: SITagliptin (JANUVIA) 100 MG tablet     Sig: Take 1 tablet by mouth daily     Dispense:  90 tablet     Refill:  1    Reviewed option of victoza for improved control without as much risk of lump forming under skin with daily injection. Return in about 4 months (around 8/29/2019) for DM, . Discussed use, benefit, and side effects of prescribed medications. All patient questions answered. Pt voiced understanding. Reviewed health maintenance shingrix reviewed. Dexa and mammogram reviewed. Instructed to continue current medications, diet and exercise. Patient agreed with treatment/plan. Follow up as directed.      Electronicallysigned by Penny Vazquez MD on 4/29/2019

## 2019-04-29 NOTE — TELEPHONE ENCOUNTER
Angie Hwang was put back on Januvia and did not get Rx for it and she wanted her Simvistatin refilled today too. Rx printed, just need signed.           Elisabeth Singletary is requesting a refill on the following medication(s):  Requested Prescriptions      No prescriptions requested or ordered in this encounter       Last Visit Date (If Applicable):  2/83/9524    Next Visit Date:    5/8/2019

## 2019-05-06 DIAGNOSIS — Z78.0 POSTMENOPAUSAL: ICD-10-CM

## 2019-05-08 ENCOUNTER — PROCEDURE VISIT (OUTPATIENT)
Dept: FAMILY MEDICINE CLINIC | Age: 64
End: 2019-05-08
Payer: MEDICARE

## 2019-05-08 VITALS
OXYGEN SATURATION: 98 % | HEIGHT: 63 IN | BODY MASS INDEX: 31.1 KG/M2 | HEART RATE: 93 BPM | DIASTOLIC BLOOD PRESSURE: 76 MMHG | SYSTOLIC BLOOD PRESSURE: 120 MMHG | WEIGHT: 175.5 LBS

## 2019-05-08 DIAGNOSIS — D49.9 NEOPLASM: ICD-10-CM

## 2019-05-08 LAB — PATHOLOGY REPORT: NORMAL

## 2019-05-08 PROCEDURE — 11402 EXC TR-EXT B9+MARG 1.1-2 CM: CPT | Performed by: FAMILY MEDICINE

## 2019-05-08 NOTE — PROGRESS NOTES
Procedure and risks have been explained to patient. All questions have been addressed and consent form has been signed. Procedure Note   5 mm x 15 mm lesion upper central back possible 2 seb k's over benign spot. Pre-op diagnosis: neoplasm atypical  Post-op diagnosis: same    Procedure:Excision of lesion full thickness    Surgeon: Jenise Peñaloza. Anesthesia: 3ml of 1% Xylocaine plain     Description: Consent obtained. Pt placed in the supine position. Time-out performed. Skin over lesion prepped and draped in the standard sterile fashion. Local anesthetic injected intra-dermally in the  region. Excision of lesion accomplished with 3-0 nylon interrupted sutures placed x 3 Specimen sent to Pathology. Bandaid with neosporin applied. Pt tolerated the procedure well. Specimen: specimen sent to pathology    Complications: None     Disposition: Pt discharged home in good condition. Tylenol for pain control, wound care instructions and activity restrictions given to pt. Return to office in 2 weeks if has not heard from office to check on pathology. Diagnosis Orders   1. Neoplasm  Surgical Pathology       There are no Patient Instructions on file for this visit.

## 2019-05-22 ENCOUNTER — NURSE ONLY (OUTPATIENT)
Dept: FAMILY MEDICINE CLINIC | Age: 64
End: 2019-05-22

## 2019-05-22 DIAGNOSIS — D49.9 NEOPLASM: Primary | ICD-10-CM

## 2019-06-19 ENCOUNTER — TELEPHONE (OUTPATIENT)
Dept: FAMILY MEDICINE CLINIC | Age: 64
End: 2019-06-19

## 2019-06-19 DIAGNOSIS — E11.9 TYPE 2 DIABETES MELLITUS WITHOUT COMPLICATION, WITHOUT LONG-TERM CURRENT USE OF INSULIN (HCC): Primary | ICD-10-CM

## 2019-07-02 NOTE — TELEPHONE ENCOUNTER
Recommend attempting ozempic. Similar to trulicity though may control without bumps. New script to pharmacy and keep planned follow up for review of results.    May stop januvia when new medciation begun  Thanks

## 2019-07-09 ENCOUNTER — TELEPHONE (OUTPATIENT)
Dept: FAMILY MEDICINE CLINIC | Age: 64
End: 2019-07-09

## 2019-07-09 NOTE — TELEPHONE ENCOUNTER
Pt came in and got a sample of Ozempic0.5mg, also gave patient assistance forms. Exp:08/2021  Lot: [id number]  Per Dr. Beckie Lopez.

## 2019-07-25 ENCOUNTER — TELEPHONE (OUTPATIENT)
Dept: FAMILY MEDICINE CLINIC | Age: 64
End: 2019-07-25

## 2019-08-19 ENCOUNTER — OFFICE VISIT (OUTPATIENT)
Dept: FAMILY MEDICINE CLINIC | Age: 64
End: 2019-08-19
Payer: MEDICARE

## 2019-08-19 VITALS
HEART RATE: 86 BPM | BODY MASS INDEX: 30.65 KG/M2 | WEIGHT: 173 LBS | OXYGEN SATURATION: 98 % | HEIGHT: 63 IN | DIASTOLIC BLOOD PRESSURE: 78 MMHG | SYSTOLIC BLOOD PRESSURE: 118 MMHG

## 2019-08-19 DIAGNOSIS — I10 ESSENTIAL HYPERTENSION: ICD-10-CM

## 2019-08-19 DIAGNOSIS — R10.13 EPIGASTRIC PAIN: ICD-10-CM

## 2019-08-19 DIAGNOSIS — E78.2 MIXED HYPERLIPIDEMIA: ICD-10-CM

## 2019-08-19 DIAGNOSIS — L82.1 SEBORRHEIC KERATOSES: ICD-10-CM

## 2019-08-19 DIAGNOSIS — D25.9 UTERINE LEIOMYOMA, UNSPECIFIED LOCATION: ICD-10-CM

## 2019-08-19 DIAGNOSIS — E11.9 TYPE 2 DIABETES MELLITUS WITHOUT COMPLICATION, WITHOUT LONG-TERM CURRENT USE OF INSULIN (HCC): Primary | ICD-10-CM

## 2019-08-19 LAB — HBA1C MFR BLD: 7.7 %

## 2019-08-19 PROCEDURE — 4040F PNEUMOC VAC/ADMIN/RCVD: CPT | Performed by: FAMILY MEDICINE

## 2019-08-19 PROCEDURE — 1123F ACP DISCUSS/DSCN MKR DOCD: CPT | Performed by: FAMILY MEDICINE

## 2019-08-19 PROCEDURE — 2022F DILAT RTA XM EVC RTNOPTHY: CPT | Performed by: FAMILY MEDICINE

## 2019-08-19 PROCEDURE — 3045F PR MOST RECENT HEMOGLOBIN A1C LEVEL 7.0-9.0%: CPT | Performed by: FAMILY MEDICINE

## 2019-08-19 PROCEDURE — G8399 PT W/DXA RESULTS DOCUMENT: HCPCS | Performed by: FAMILY MEDICINE

## 2019-08-19 PROCEDURE — 99214 OFFICE O/P EST MOD 30 MIN: CPT | Performed by: FAMILY MEDICINE

## 2019-08-19 PROCEDURE — 83036 HEMOGLOBIN GLYCOSYLATED A1C: CPT | Performed by: FAMILY MEDICINE

## 2019-08-19 PROCEDURE — G8417 CALC BMI ABV UP PARAM F/U: HCPCS | Performed by: FAMILY MEDICINE

## 2019-08-19 PROCEDURE — G8427 DOCREV CUR MEDS BY ELIG CLIN: HCPCS | Performed by: FAMILY MEDICINE

## 2019-08-19 PROCEDURE — 1090F PRES/ABSN URINE INCON ASSESS: CPT | Performed by: FAMILY MEDICINE

## 2019-08-19 PROCEDURE — 1036F TOBACCO NON-USER: CPT | Performed by: FAMILY MEDICINE

## 2019-08-19 PROCEDURE — 3017F COLORECTAL CA SCREEN DOC REV: CPT | Performed by: FAMILY MEDICINE

## 2019-08-19 RX ORDER — ACARBOSE 50 MG/1
50 TABLET ORAL 2 TIMES DAILY WITH MEALS
Qty: 180 TABLET | Refills: 1
Start: 2019-08-19 | End: 2019-11-04 | Stop reason: SDUPTHER

## 2019-08-19 ASSESSMENT — ENCOUNTER SYMPTOMS
COUGH: 1
SHORTNESS OF BREATH: 0
SORE THROAT: 1

## 2019-08-19 NOTE — PROGRESS NOTES
Future     Standing Expiration Date:   2020     Order Specific Question:   Reason for exam:     Answer:   abdominal pain    Lipid Panel     Standing Status:   Future     Standing Expiration Date:   2020     Order Specific Question:   Is Patient Fasting?/# of Hours     Answer:   10-12    Electrolyte Panel     Standing Status:   Future     Standing Expiration Date:   2020    Creatinine, Serum     Standing Status:   Future     Standing Expiration Date:   2020    Microalbumin, Ur     Standing Status:   Future     Standing Expiration Date:   2020    POCT glycosylated hemoglobin (Hb A1C)     Orders Placed This Encounter   Medications    acarbose (PRECOSE) 50 MG tablet     Sig: Take 1 tablet by mouth 2 times daily (with meals)     Dispense:  180 tablet     Refill:  1    Semaglutide 1 MG/DOSE SOPN     Si mg subcutaneous weekly     Dispense:  4.5 mL     Refill:  1     New dose        Return in about 12 weeks (around 2019) for DM, HTN. May consider lowering lisinopril if getting light headed prior to next follow up          Discussed use, benefit, and side effects of prescribed medications. All patient questions answered. Pt voiced understanding. Reviewed health maintenance lab updated. Instructed to continue current medications, diet and exercise. Patient agreed with treatment/plan. Follow up as directed.      Electronicallysigned by Rachele Allen MD on 2019

## 2019-09-25 DIAGNOSIS — E11.9 TYPE 2 DIABETES MELLITUS WITHOUT COMPLICATION, WITHOUT LONG-TERM CURRENT USE OF INSULIN (HCC): ICD-10-CM

## 2019-10-31 LAB
AGE FOR GFR: 66
ANION GAP SERPL CALCULATED.3IONS-SCNC: 16 MMOL/L
CHLORIDE BLD-SCNC: 104 MMOL/L (ref 98–120)
CHOLESTEROL/HDL RATIO: 2.6 RATIO (ref 0–4.5)
CHOLESTEROL: 158 MG/DL (ref 50–200)
CO2: 27 MMOL/L (ref 22–31)
CREAT SERPL-MCNC: 0.8 MG/DL (ref 0.5–1)
CREATININE, RANDOM: 205 MG/DL (ref 20–370)
EGFR BF: 87 ML/MIN/1.73 M2
EGFR BM: 117 ML/MIN/1.73 M2
EGFR WF: 72 ML/MIN/1.73 M2
EGFR WM: 97 ML/MIN/1.73 M2
HDL, DIRECT: 60 MG/DL (ref 36–68)
LDL CHOLESTEROL CALCULATED: 63.6 MG/DL (ref 0–160)
MICROALBUMIN UR-MCNC: 2.8 MG/DL (ref 0–1.7)
MICROALBUMIN/CREAT UR-RTO: 13.7 MCG/MG CR
POTASSIUM SERPL-SCNC: 4.5 MMOL/L (ref 3.6–5)
SODIUM BLD-SCNC: 142 MMOL/L (ref 135–145)
TRIGL SERPL-MCNC: 172 MG/DL (ref 10–250)
VLDLC SERPL CALC-MCNC: 34 MG/DL (ref 0–40)

## 2019-11-04 ENCOUNTER — OFFICE VISIT (OUTPATIENT)
Dept: FAMILY MEDICINE CLINIC | Age: 64
End: 2019-11-04
Payer: MEDICARE

## 2019-11-04 VITALS
WEIGHT: 175 LBS | TEMPERATURE: 98.4 F | BODY MASS INDEX: 31.01 KG/M2 | HEART RATE: 77 BPM | DIASTOLIC BLOOD PRESSURE: 80 MMHG | SYSTOLIC BLOOD PRESSURE: 130 MMHG | HEIGHT: 63 IN | OXYGEN SATURATION: 98 %

## 2019-11-04 DIAGNOSIS — E78.2 MIXED HYPERLIPIDEMIA: ICD-10-CM

## 2019-11-04 DIAGNOSIS — Z23 IMMUNIZATION DUE: ICD-10-CM

## 2019-11-04 DIAGNOSIS — E11.9 TYPE 2 DIABETES MELLITUS WITHOUT COMPLICATION, WITHOUT LONG-TERM CURRENT USE OF INSULIN (HCC): Primary | ICD-10-CM

## 2019-11-04 DIAGNOSIS — I10 ESSENTIAL HYPERTENSION: ICD-10-CM

## 2019-11-04 LAB — HBA1C MFR BLD: 8.3 %

## 2019-11-04 PROCEDURE — 90732 PPSV23 VACC 2 YRS+ SUBQ/IM: CPT | Performed by: FAMILY MEDICINE

## 2019-11-04 PROCEDURE — G8482 FLU IMMUNIZE ORDER/ADMIN: HCPCS | Performed by: FAMILY MEDICINE

## 2019-11-04 PROCEDURE — 83036 HEMOGLOBIN GLYCOSYLATED A1C: CPT | Performed by: FAMILY MEDICINE

## 2019-11-04 PROCEDURE — G0009 ADMIN PNEUMOCOCCAL VACCINE: HCPCS | Performed by: FAMILY MEDICINE

## 2019-11-04 PROCEDURE — G8427 DOCREV CUR MEDS BY ELIG CLIN: HCPCS | Performed by: FAMILY MEDICINE

## 2019-11-04 PROCEDURE — 1090F PRES/ABSN URINE INCON ASSESS: CPT | Performed by: FAMILY MEDICINE

## 2019-11-04 PROCEDURE — 3017F COLORECTAL CA SCREEN DOC REV: CPT | Performed by: FAMILY MEDICINE

## 2019-11-04 PROCEDURE — 4040F PNEUMOC VAC/ADMIN/RCVD: CPT | Performed by: FAMILY MEDICINE

## 2019-11-04 PROCEDURE — 1036F TOBACCO NON-USER: CPT | Performed by: FAMILY MEDICINE

## 2019-11-04 PROCEDURE — 2022F DILAT RTA XM EVC RTNOPTHY: CPT | Performed by: FAMILY MEDICINE

## 2019-11-04 PROCEDURE — G8417 CALC BMI ABV UP PARAM F/U: HCPCS | Performed by: FAMILY MEDICINE

## 2019-11-04 PROCEDURE — G0008 ADMIN INFLUENZA VIRUS VAC: HCPCS | Performed by: FAMILY MEDICINE

## 2019-11-04 PROCEDURE — 3052F HG A1C>EQUAL 8.0%<EQUAL 9.0%: CPT | Performed by: FAMILY MEDICINE

## 2019-11-04 PROCEDURE — 99214 OFFICE O/P EST MOD 30 MIN: CPT | Performed by: FAMILY MEDICINE

## 2019-11-04 PROCEDURE — 1123F ACP DISCUSS/DSCN MKR DOCD: CPT | Performed by: FAMILY MEDICINE

## 2019-11-04 PROCEDURE — 90662 IIV NO PRSV INCREASED AG IM: CPT | Performed by: FAMILY MEDICINE

## 2019-11-04 PROCEDURE — G8399 PT W/DXA RESULTS DOCUMENT: HCPCS | Performed by: FAMILY MEDICINE

## 2019-11-04 RX ORDER — LISINOPRIL 20 MG/1
30 TABLET ORAL DAILY
Qty: 135 TABLET | Refills: 1 | Status: SHIPPED | OUTPATIENT
Start: 2019-11-04 | End: 2020-04-22

## 2019-11-04 RX ORDER — ACARBOSE 25 MG/1
25 TABLET ORAL 2 TIMES DAILY WITH MEALS
Qty: 180 TABLET | Refills: 1 | Status: SHIPPED | OUTPATIENT
Start: 2019-11-04 | End: 2020-05-22 | Stop reason: SDUPTHER

## 2019-11-04 RX ORDER — SIMVASTATIN 20 MG
20 TABLET ORAL NIGHTLY
Qty: 90 TABLET | Refills: 1 | Status: SHIPPED | OUTPATIENT
Start: 2019-11-04 | End: 2020-04-22

## 2019-11-04 ASSESSMENT — ENCOUNTER SYMPTOMS: SHORTNESS OF BREATH: 0

## 2020-05-22 RX ORDER — LISINOPRIL 20 MG/1
TABLET ORAL
Qty: 135 TABLET | Refills: 0 | Status: SHIPPED | OUTPATIENT
Start: 2020-05-22 | End: 2020-09-03

## 2020-05-22 RX ORDER — SIMVASTATIN 20 MG
20 TABLET ORAL NIGHTLY
Qty: 90 TABLET | Refills: 0 | Status: SHIPPED | OUTPATIENT
Start: 2020-05-22 | End: 2020-09-03

## 2020-05-22 RX ORDER — ACARBOSE 25 MG/1
25 TABLET ORAL 2 TIMES DAILY WITH MEALS
Qty: 180 TABLET | Refills: 0 | Status: SHIPPED | OUTPATIENT
Start: 2020-05-22 | End: 2020-11-02

## 2020-08-25 ENCOUNTER — TELEPHONE (OUTPATIENT)
Dept: FAMILY MEDICINE CLINIC | Age: 65
End: 2020-08-25

## 2020-08-25 NOTE — TELEPHONE ENCOUNTER
Pt scheduled apt for f/u, wants to know if you can put labs in so she can get them drawn before apt.

## 2020-08-31 LAB
ANION GAP SERPL CALCULATED.3IONS-SCNC: 13.5 MMOL/L
CHLORIDE BLD-SCNC: 101 MMOL/L (ref 98–120)
CO2: 24 MMOL/L (ref 22–31)
CREAT SERPL-MCNC: 0.8 MG/DL (ref 0.5–1)
GFR CALCULATED: > 60
HBA1C MFR BLD: 8.7 % (ref 4.4–6.4)
POTASSIUM SERPL-SCNC: 4.2 MMOL/L (ref 3.6–5)
SODIUM BLD-SCNC: 138 MMOL/L (ref 135–145)

## 2020-08-31 NOTE — TELEPHONE ENCOUNTER
Thanks, may have done today or tomorrow am- does not need to be fasting for electrolytes and A1C.    Thanks

## 2020-09-01 ENCOUNTER — OFFICE VISIT (OUTPATIENT)
Dept: FAMILY MEDICINE CLINIC | Age: 65
End: 2020-09-01
Payer: MEDICARE

## 2020-09-01 VITALS
DIASTOLIC BLOOD PRESSURE: 78 MMHG | SYSTOLIC BLOOD PRESSURE: 112 MMHG | BODY MASS INDEX: 30.12 KG/M2 | OXYGEN SATURATION: 98 % | HEART RATE: 90 BPM | HEIGHT: 63 IN | WEIGHT: 170 LBS

## 2020-09-01 PROCEDURE — 2022F DILAT RTA XM EVC RTNOPTHY: CPT | Performed by: FAMILY MEDICINE

## 2020-09-01 PROCEDURE — 90662 IIV NO PRSV INCREASED AG IM: CPT | Performed by: FAMILY MEDICINE

## 2020-09-01 PROCEDURE — 1090F PRES/ABSN URINE INCON ASSESS: CPT | Performed by: FAMILY MEDICINE

## 2020-09-01 PROCEDURE — 99211 OFF/OP EST MAY X REQ PHY/QHP: CPT | Performed by: FAMILY MEDICINE

## 2020-09-01 PROCEDURE — G8399 PT W/DXA RESULTS DOCUMENT: HCPCS | Performed by: FAMILY MEDICINE

## 2020-09-01 PROCEDURE — 99215 OFFICE O/P EST HI 40 MIN: CPT | Performed by: FAMILY MEDICINE

## 2020-09-01 PROCEDURE — 3017F COLORECTAL CA SCREEN DOC REV: CPT | Performed by: FAMILY MEDICINE

## 2020-09-01 PROCEDURE — 1036F TOBACCO NON-USER: CPT | Performed by: FAMILY MEDICINE

## 2020-09-01 PROCEDURE — 3052F HG A1C>EQUAL 8.0%<EQUAL 9.0%: CPT | Performed by: FAMILY MEDICINE

## 2020-09-01 PROCEDURE — 1123F ACP DISCUSS/DSCN MKR DOCD: CPT | Performed by: FAMILY MEDICINE

## 2020-09-01 PROCEDURE — 4040F PNEUMOC VAC/ADMIN/RCVD: CPT | Performed by: FAMILY MEDICINE

## 2020-09-01 PROCEDURE — G8417 CALC BMI ABV UP PARAM F/U: HCPCS | Performed by: FAMILY MEDICINE

## 2020-09-01 PROCEDURE — G8427 DOCREV CUR MEDS BY ELIG CLIN: HCPCS | Performed by: FAMILY MEDICINE

## 2020-09-01 RX ORDER — INSULIN DETEMIR 100 [IU]/ML
30 INJECTION, SOLUTION SUBCUTANEOUS NIGHTLY
Qty: 5 PEN | Refills: 1 | Status: SHIPPED | OUTPATIENT
Start: 2020-09-01 | End: 2020-11-30 | Stop reason: SDUPTHER

## 2020-09-01 RX ORDER — CLOTRIMAZOLE 1 G/ML
SOLUTION TOPICAL
Qty: 15 ML | Refills: 0 | Status: SHIPPED | OUTPATIENT
Start: 2020-09-01 | End: 2020-11-02

## 2020-09-01 RX ORDER — CEPHALEXIN 500 MG/1
500 CAPSULE ORAL 2 TIMES DAILY
Qty: 14 CAPSULE | Refills: 0 | Status: SHIPPED | OUTPATIENT
Start: 2020-09-01 | End: 2020-11-02

## 2020-09-01 ASSESSMENT — PATIENT HEALTH QUESTIONNAIRE - PHQ9
SUM OF ALL RESPONSES TO PHQ9 QUESTIONS 1 & 2: 0
SUM OF ALL RESPONSES TO PHQ QUESTIONS 1-9: 0
SUM OF ALL RESPONSES TO PHQ QUESTIONS 1-9: 0
2. FEELING DOWN, DEPRESSED OR HOPELESS: 0
1. LITTLE INTEREST OR PLEASURE IN DOING THINGS: 0

## 2020-09-01 ASSESSMENT — ENCOUNTER SYMPTOMS: SHORTNESS OF BREATH: 0

## 2020-09-01 NOTE — PROGRESS NOTES
[address] [address]  Dept: [phone]  Dept Fax: [phone]    Aleta Closs is a 77 y.o. female who presents today for her medical conditions/complaints as noted below. Aleta Closs is c/o of Diabetes      HPI:     HPI  Here for follow up of HTN, DM and Hyperlipidemia  Taking all medications regularly except acarbose due ran out and cost 121$  No side effects noted    other complaint currently spot on left lower back seems like spider bite patient happened approximately 2 days ago no pain or burning just itches been noted. Left big toe irritated with dead skin noted initially ongoing for approximately 3 weeks.     BP Readings from Last 3 Encounters:   20 112/78   19 130/80   19 118/78          (goal 120/80)    Past Medical History:   Diagnosis Date    Hyperlipidemia     Hypertension     Type 2 diabetes mellitus without complication (HCC)       Past Surgical History:   Procedure Laterality Date    CYST REMOVAL      spinal cyst    KNEE SURGERY Right 2017    TUBAL LIGATION         Family History   Problem Relation Age of Onset    Diabetes Mother     Breast Cancer Mother     Cancer Mother 66        breast- , BCC lip-70    High Blood Pressure Mother     Diabetes Father     Heart Attack Father     Heart Attack Paternal Grandmother        Social History     Tobacco Use    Smoking status: Former Smoker     Packs/day: 0.25     Years: 5.00     Pack years: 1.25     Types: Cigarettes     Last attempt to quit: 1977     Years since quittin.6    Smokeless tobacco: Never Used   Substance Use Topics    Alcohol use: No      Current Outpatient Medications   Medication Sig Dispense Refill    BIOTIN PO Take by mouth      NIACIN PO Take by mouth      insulin detemir (LEVEMIR FLEXTOUCH) 100 UNIT/ML injection pen Inject 30 Units into the skin nightly 5 pen 1    cephALEXin (KEFLEX) 500 MG capsule Take 1 capsule by mouth 2 times daily for 7 days 14 capsule 0    clotrimazole (LOTRIMIN) 1 % external solution Apply topically 2 times daily. 15 mL 0    lisinopril (PRINIVIL;ZESTRIL) 20 MG tablet TAKE 1 AND 1/2 TABLETS BY MOUTH DAILY 135 tablet 0    metFORMIN (GLUCOPHAGE) 500 MG tablet TAKE 2 TABLETS BY MOUTH TWICE A DAY WITH MEALS 360 tablet 0    Semaglutide, 1 MG/DOSE, 2 MG/1.5ML SOPN 1 mg subcutaneous weekly 4.5 mL 0    simvastatin (ZOCOR) 20 MG tablet Take 1 tablet by mouth nightly 90 tablet 0    Cholecalciferol (VITAMIN D3) 5000 UNITS TABS Take 1 tablet by mouth daily      vitamin B-12 (CYANOCOBALAMIN) 500 MCG tablet Take 500 mcg by mouth daily      aspirin 81 MG tablet Take 81 mg by mouth daily      acarbose (PRECOSE) 25 MG tablet Take 1 tablet by mouth 2 times daily (with meals) (Patient not taking: Reported on 9/1/2020) 180 tablet 0    Multiple Vitamins-Minerals (THERAPEUTIC MULTIVITAMIN-MINERALS) tablet Take 1 tablet by mouth daily       No current facility-administered medications for this visit.       Allergies   Allergen Reactions    Advil [Ibuprofen] Nausea Only    Farxiga [Dapagliflozin] Rash     Yeast infections commonly occuring       Health Maintenance   Topic Date Due    Diabetic retinal exam  10/02/2019    DTaP/Tdap/Td vaccine (2 - Td) 03/15/2020    Diabetic foot exam  04/29/2020    Breast cancer screen  05/02/2020    Flu vaccine (1) 09/01/2020    Shingles Vaccine (1 of 2) 08/19/2022 (Originally 5/12/2003)    Diabetic microalbuminuria test  10/31/2020    Lipid screen  10/31/2020    Potassium monitoring  10/31/2020    Creatinine monitoring  10/31/2020    A1C test (Diabetic or Prediabetic)  11/04/2020    Colon cancer screen colonoscopy  10/02/2028    DEXA (modify frequency per FRAX score)  Completed    Pneumococcal 65+ years Vaccine  Completed    Hepatitis C screen  Completed    Hepatitis A vaccine  Aged Out    Hib vaccine  Aged Out    Meningococcal (ACWY) vaccine  Aged Out       Subjective: Review of Systems   Constitutional: Negative for fatigue. Left foot big toe, irritated, started three weeks ago, started with dead skin     Eyes: Negative for visual disturbance. Respiratory: Negative for shortness of breath. Cardiovascular: Negative for chest pain, palpitations and leg swelling. Genitourinary: Negative for frequency. Skin:        Lower left side of back believes there might be a spider bite, noticed it two days ago, does not hurt or burn, just itches     Neurological: Negative for dizziness. No recent eye exam.    Blood Sugar Checks? yes  Medication Compliant? yes  Blood Pressure Checks? no    Objective:     /78 (Site: Left Upper Arm, Position: Sitting, Cuff Size: Large Adult)   Pulse 90   Ht 5' 3\" (1.6 m)   Wt 170 lb (77.1 kg)   SpO2 98%   BMI 30.11 kg/m²   Physical Exam  Constitutional:       General: She is not in acute distress. Appearance: She is obese. She is not ill-appearing. Comments: Weight improving   HENT:      Head: Normocephalic. Eyes:      Conjunctiva/sclera: Conjunctivae normal.   Neck:      Musculoskeletal: Neck supple. No muscular tenderness. Vascular: No carotid bruit. Cardiovascular:      Rate and Rhythm: Normal rate and regular rhythm. Heart sounds: No murmur. Pulmonary:      Effort: Pulmonary effort is normal. No respiratory distress. Musculoskeletal:         General: No swelling. Lymphadenopathy:      Cervical: No cervical adenopathy. Skin:     Findings: Rash (left outer back 2 erythematous patches 1 and 1.5 cm diameter) present. Comments: Left great toe with flaking and erythema no streaking   Neurological:      Mental Status: She is alert. Psychiatric:         Mood and Affect: Mood normal.         Judgment: Judgment normal.       Diabetic foot exam - normal strength; intact sensation to 10 gm monofilament right, diminished at great toe on left; normal pulses, no ulcerations or callouses.  Good capillary refill. Toenails unremarkable. Lab Results   Component Value Date    LABA1C 8.7 (H) 08/31/2020     No results found for: EAG  Weight down 5 # to 3 year lisa. Assessment:      1. Type 2 diabetes mellitus without complication, without long-term current use of insulin ([facility] [address]    2. Essential hypertension    3. Mixed hyperlipidemia    4. Cellulitis of back except buttock    5. Tinea pedis of left foot             Plan:     Patient Instructions   Encourage Tdap  May be able to get off insulin if able to exercise 5 days per week and decrease weight reviewed    No orders of the defined types were placed in this encounter. Orders Placed This Encounter   Medications    insulin detemir (LEVEMIR FLEXTOUCH) 100 UNIT/ML injection pen     Sig: Inject 30 Units into the skin nightly     Dispense:  5 pen     Refill:  1    cephALEXin (KEFLEX) 500 MG capsule     Sig: Take 1 capsule by mouth 2 times daily for 7 days     Dispense:  14 capsule     Refill:  0    clotrimazole (LOTRIMIN) 1 % external solution     Sig: Apply topically 2 times daily. Dispense:  15 mL     Refill:  0     Patient was seen with total face to face time of 45 minutes. More than 50%   of this visit was counseling and education regarding complex care as well   as counseling on need for specialty follow-up given severity of symptoms. Return in about 2 months (around 11/1/2020) for DM. Discussed use, benefit, and side effects of prescribed medications. All patient questions answered. Pt voiced understanding. Reviewed health maintenance optho evaluation, Flu and Tdap, . Instructed to continue current medications, diet and exercise. Patient agreed with treatment/plan. Follow up as directed.      Electronicallysigned by Martínez Jasso MD on 9/1/2020

## 2020-10-06 NOTE — TELEPHONE ENCOUNTER
[facility] is calling to request a refill on the following medication(s):  Requested Prescriptions     Pending Prescriptions Disp Refills    metFORMIN (GLUCOPHAGE) 500 MG tablet [Pharmacy Med Name: METFORMIN TAB 500MG] 360 tablet 1     Sig: TAKE 2 TABLETS 2 TIMES     DAILY WITH MEALS       Last Visit Date (If Applicable):  7/5/4486    Next Visit Date:    11/2/2020

## 2020-11-02 ENCOUNTER — OFFICE VISIT (OUTPATIENT)
Dept: FAMILY MEDICINE CLINIC | Age: 65
End: 2020-11-02
Payer: MEDICARE

## 2020-11-02 VITALS
HEART RATE: 77 BPM | DIASTOLIC BLOOD PRESSURE: 78 MMHG | SYSTOLIC BLOOD PRESSURE: 120 MMHG | BODY MASS INDEX: 30.19 KG/M2 | WEIGHT: 170.4 LBS | TEMPERATURE: 96.7 F | HEIGHT: 63 IN | OXYGEN SATURATION: 97 %

## 2020-11-02 LAB — HBA1C MFR BLD: 7.2 %

## 2020-11-02 PROCEDURE — 4040F PNEUMOC VAC/ADMIN/RCVD: CPT | Performed by: FAMILY MEDICINE

## 2020-11-02 PROCEDURE — 2022F DILAT RTA XM EVC RTNOPTHY: CPT | Performed by: FAMILY MEDICINE

## 2020-11-02 PROCEDURE — G8399 PT W/DXA RESULTS DOCUMENT: HCPCS | Performed by: FAMILY MEDICINE

## 2020-11-02 PROCEDURE — 99214 OFFICE O/P EST MOD 30 MIN: CPT | Performed by: FAMILY MEDICINE

## 2020-11-02 PROCEDURE — 3017F COLORECTAL CA SCREEN DOC REV: CPT | Performed by: FAMILY MEDICINE

## 2020-11-02 PROCEDURE — G8427 DOCREV CUR MEDS BY ELIG CLIN: HCPCS | Performed by: FAMILY MEDICINE

## 2020-11-02 PROCEDURE — G8484 FLU IMMUNIZE NO ADMIN: HCPCS | Performed by: FAMILY MEDICINE

## 2020-11-02 PROCEDURE — G8417 CALC BMI ABV UP PARAM F/U: HCPCS | Performed by: FAMILY MEDICINE

## 2020-11-02 PROCEDURE — 1090F PRES/ABSN URINE INCON ASSESS: CPT | Performed by: FAMILY MEDICINE

## 2020-11-02 PROCEDURE — 1036F TOBACCO NON-USER: CPT | Performed by: FAMILY MEDICINE

## 2020-11-02 PROCEDURE — 3051F HG A1C>EQUAL 7.0%<8.0%: CPT | Performed by: FAMILY MEDICINE

## 2020-11-02 PROCEDURE — 83036 HEMOGLOBIN GLYCOSYLATED A1C: CPT | Performed by: FAMILY MEDICINE

## 2020-11-02 PROCEDURE — 1123F ACP DISCUSS/DSCN MKR DOCD: CPT | Performed by: FAMILY MEDICINE

## 2020-11-02 ASSESSMENT — ENCOUNTER SYMPTOMS
COUGH: 0
ABDOMINAL PAIN: 0
WHEEZING: 0
SHORTNESS OF BREATH: 0

## 2020-11-02 NOTE — PROGRESS NOTES
[address] [address]  Dept: [phone]  Dept Fax: [phone]    Day Chen is a 77 y.o. female who presents today for her medical conditions/complaints as noted below.   Dya Chen is c/o of Diabetes    HPI:     HPI  Here for follow up of HTN, DM and Hyperlipidemia  Taking all medications regularly, stopped semaglutide with $800 cost  No side effects noted    No other complaint currently    BP Readings from Last 3 Encounters:   20 120/78   20 112/78   19 130/80          (goal 120/80)    Past Medical History:   Diagnosis Date    Hyperlipidemia     Hypertension     Type 2 diabetes mellitus without complication (HCC)       Past Surgical History:   Procedure Laterality Date    CYST REMOVAL      spinal cyst    KNEE SURGERY Right 2017    TUBAL LIGATION         Family History   Problem Relation Age of Onset    Diabetes Mother     Breast Cancer Mother     Cancer Mother 66        breast- , BCC lip-70    High Blood Pressure Mother     Diabetes Father     Heart Attack Father     Heart Attack Paternal Grandmother        Social History     Tobacco Use    Smoking status: Former Smoker     Packs/day: 0.25     Years: 5.00     Pack years: 1.25     Types: Cigarettes     Last attempt to quit:      Years since quittin.8    Smokeless tobacco: Never Used   Substance Use Topics    Alcohol use: No      Current Outpatient Medications   Medication Sig Dispense Refill    metFORMIN (GLUCOPHAGE) 500 MG tablet TAKE 2 TABLETS 2 TIMES     DAILY WITH MEALS 360 tablet 1    simvastatin (ZOCOR) 20 MG tablet Take 1 tablet by mouth nightly 90 tablet 3    lisinopril (PRINIVIL;ZESTRIL) 20 MG tablet TAKE 1 & 1/2 (ONE & ONE-HALF) TABLETS BY MOUTH ONCE DAILY 135 tablet 1    BIOTIN PO Take by mouth      NIACIN PO Take by mouth      insulin detemir (LEVEMIR FLEXTOUCH) 100 UNIT/ML injection pen Inject 30 Units into the skin nightly 5 pen 1    Cholecalciferol (VITAMIN D3) 5000 UNITS TABS Take 1 tablet by mouth daily      Multiple Vitamins-Minerals (THERAPEUTIC MULTIVITAMIN-MINERALS) tablet Take 1 tablet by mouth daily      vitamin B-12 (CYANOCOBALAMIN) 500 MCG tablet Take 500 mcg by mouth daily      aspirin 81 MG tablet Take 81 mg by mouth daily       No current facility-administered medications for this visit. Allergies   Allergen Reactions    Advil [Ibuprofen] Nausea Only    Farxiga [Dapagliflozin] Rash     Yeast infections commonly occuring       Health Maintenance   Topic Date Due    DTaP/Tdap/Td vaccine (2 - Td) 03/15/2020    Diabetic microalbuminuria test  10/31/2020    Lipid screen  10/31/2020    Shingles Vaccine (1 of 2) 08/19/2022 (Originally 5/12/2003)    Breast cancer screen  05/02/2021    A1C test (Diabetic or Prediabetic)  08/31/2021    Potassium monitoring  08/31/2021    Creatinine monitoring  08/31/2021    Diabetic foot exam  09/01/2021    Diabetic retinal exam  10/23/2021    Colon cancer screen colonoscopy  10/02/2028    DEXA (modify frequency per FRAX score)  Completed    Flu vaccine  Completed    Pneumococcal 65+ years Vaccine  Completed    Hepatitis C screen  Completed    Hepatitis A vaccine  Aged Out    Hib vaccine  Aged Out    Meningococcal (ACWY) vaccine  Aged Out       Subjective:      Review of Systems   Constitutional: Negative for fatigue and fever. Respiratory: Negative for cough, shortness of breath and wheezing. Cardiovascular: Negative for chest pain, palpitations and leg swelling. Gastrointestinal: Negative for abdominal pain. Endocrine: Negative for polydipsia. Genitourinary: Negative for frequency and urgency. Neurological: Positive for headaches (did have a headache when she first started the new injectable states its gone now though. ). Negative for dizziness. Occurred couple days only on insulin    Blood Sugar Checks? yes  Medication Compliant?  Is not taking the weekly injection due to not being able to afford both medications. Blood Pressure Checks? yes    Objective:     /78   Pulse 77   Temp 96.7 °F (35.9 °C)   Ht 5' 3\" (1.6 m)   Wt 170 lb 6.4 oz (77.3 kg)   SpO2 97%   BMI 30.19 kg/m²   Physical Exam  Vitals signs reviewed. Constitutional:       General: She is not in acute distress. Appearance: She is well-developed. HENT:      Head: Atraumatic. Eyes:      Conjunctiva/sclera: Conjunctivae normal.   Neck:      Musculoskeletal: Neck supple. Thyroid: No thyromegaly. Vascular: No carotid bruit. Cardiovascular:      Rate and Rhythm: Normal rate and regular rhythm. Heart sounds: No murmur. Pulmonary:      Effort: Pulmonary effort is normal.      Breath sounds: Normal breath sounds. Abdominal:      General: Bowel sounds are normal.      Palpations: Abdomen is soft. Musculoskeletal:         General: No swelling (BLE). Neurological:      Mental Status: She is alert and oriented to person, place, and time. Psychiatric:         Mood and Affect: Mood normal.         Thought Content: Thought content normal.         Judgment: Judgment normal.     pt tearful happy with excellent results on sugars reviewed    Lab Results   Component Value Date    LABA1C 8.7 (H) 08/31/2020   A1C 7.2 down from 8.7    No results found for: EAG  Kept weight very near lisa    Assessment:      1. Type 2 diabetes mellitus without complication, with long-term current use of insulin ([facility] [address]    2. Essential hypertension    3. Mixed hyperlipidemia    4. Lipoprotein deficiency           Plan:     Patient Instructions   Encourage maintaining weight or dropping another 5-10 # if able while in [address] [place] [address] for mammogram in spring  May get Tdap at health department, pharmacy or office when desired. No orders of the defined types were placed in this encounter. No orders of the defined types were placed in this encounter.        Return in about 6 months (around 5/2/2021) for DM, HTN, lipid. Discussed use, benefit, and side effects of prescribed medications. All patient questions answered. Pt voiced understanding. Reviewed health maintenance Tdap reviewed. Instructed to continue current medications, diet and exercise. Patient agreed with treatment/plan. Follow up as directed.      Electronicallysigned by Edmund Arenas MD on 11/2/2020

## 2020-11-02 NOTE — PATIENT INSTRUCTIONS
Encourage maintaining weight or dropping another 5-10 # if able while in [address] [place] [address] for mammogram in spring  May get Tdap at health department, pharmacy or office when desired.

## 2020-11-30 RX ORDER — INSULIN DETEMIR 100 [IU]/ML
30 INJECTION, SOLUTION SUBCUTANEOUS NIGHTLY
Qty: 5 PEN | Refills: 1 | Status: SHIPPED | OUTPATIENT
Start: 2020-11-30 | End: 2021-05-11

## 2020-11-30 NOTE — TELEPHONE ENCOUNTER
Jun Nieves is requesting a refill on the following medication(s):  Requested Prescriptions     Pending Prescriptions Disp Refills    insulin detemir (LEVEMIR FLEXTOUCH) 100 UNIT/ML injection pen 5 pen 1     Sig: Inject 30 Units into the skin nightly       Last Visit Date (If Applicable):  14/4/9601    Next Visit Date:    5/10/2021

## 2021-03-02 DIAGNOSIS — I10 HYPERTENSION, UNSPECIFIED TYPE: ICD-10-CM

## 2021-03-02 DIAGNOSIS — E11.9 TYPE 2 DIABETES MELLITUS WITHOUT COMPLICATION, WITHOUT LONG-TERM CURRENT USE OF INSULIN (HCC): ICD-10-CM

## 2021-03-02 RX ORDER — LISINOPRIL 20 MG/1
TABLET ORAL
Qty: 135 TABLET | Refills: 1 | Status: SHIPPED | OUTPATIENT
Start: 2021-03-02 | End: 2021-08-16

## 2021-03-02 RX ORDER — SIMVASTATIN 20 MG
20 TABLET ORAL NIGHTLY
Qty: 90 TABLET | Refills: 3 | Status: SHIPPED | OUTPATIENT
Start: 2021-03-02

## 2021-03-02 NOTE — TELEPHONE ENCOUNTER
Prateek Zacarias is requesting a refill on the following medication(s):  Requested Prescriptions     Pending Prescriptions Disp Refills    metFORMIN (GLUCOPHAGE) 500 MG tablet 360 tablet 1     Sig: TAKE 2 TABLETS 2 TIMES     DAILY WITH MEALS    lisinopril (PRINIVIL;ZESTRIL) 20 MG tablet 135 tablet 1     Sig: TAKE 1 & 1/2 (ONE & ONE-HALF) TABLETS BY MOUTH ONCE DAILY    simvastatin (ZOCOR) 20 MG tablet 90 tablet 3     Sig: Take 1 tablet by mouth nightly       Last Visit Date (If Applicable):  07/1/1842    Next Visit Date:    5/10/2021

## 2021-05-11 DIAGNOSIS — E11.9 TYPE 2 DIABETES MELLITUS WITHOUT COMPLICATION, WITHOUT LONG-TERM CURRENT USE OF INSULIN (HCC): ICD-10-CM

## 2021-05-11 RX ORDER — INSULIN DETEMIR 100 [IU]/ML
INJECTION, SOLUTION SUBCUTANEOUS
Qty: 15 ML | Refills: 0 | Status: SHIPPED | OUTPATIENT
Start: 2021-05-11 | End: 2021-06-03 | Stop reason: SDUPTHER

## 2021-05-11 NOTE — TELEPHONE ENCOUNTER
Ala Barrier is requesting a refill on the following medication(s):  Requested Prescriptions     Pending Prescriptions Disp Refills    LEVEMIR FLEXTOUCH 100 UNIT/ML injection pen [Pharmacy Med Name: Levemir FlexTouch 100 UNIT/ML Subcutaneous Solution Pen-injector] 15 mL 0     Sig: INJECT 30 UNITS SUBCUTANEOUSLY NIGHTLY       Last Visit Date (If Applicable):  08/9/8311    Next Visit Date:    Visit date not found

## 2021-05-28 ENCOUNTER — HOSPITAL ENCOUNTER (OUTPATIENT)
Age: 66
Setting detail: SPECIMEN
Discharge: HOME | End: 2021-05-28
Payer: MEDICARE

## 2021-05-28 DIAGNOSIS — E11.9 TYPE 2 DIABETES MELLITUS WITHOUT COMPLICATION, WITH LONG-TERM CURRENT USE OF INSULIN (HCC): ICD-10-CM

## 2021-05-28 DIAGNOSIS — I10 ESSENTIAL HYPERTENSION: ICD-10-CM

## 2021-05-28 DIAGNOSIS — E78.2 MIXED HYPERLIPIDEMIA: ICD-10-CM

## 2021-05-28 DIAGNOSIS — Z79.4 TYPE 2 DIABETES MELLITUS WITHOUT COMPLICATION, WITH LONG-TERM CURRENT USE OF INSULIN (HCC): ICD-10-CM

## 2021-05-28 LAB
ANION GAP SERPL CALCULATED.3IONS-SCNC: 9 MMOL/L (ref 9–17)
CHLORIDE BLD-SCNC: 102 MMOL/L (ref 98–107)
CO2: 28 MMOL/L (ref 20–31)
CREAT SERPL-MCNC: 0.69 MG/DL (ref 0.5–0.9)
GFR AFRICAN AMERICAN: >60 ML/MIN
GFR NON-AFRICAN AMERICAN: >60 ML/MIN
GFR SERPL CREATININE-BSD FRML MDRD: NORMAL ML/MIN/{1.73_M2}
GFR SERPL CREATININE-BSD FRML MDRD: NORMAL ML/MIN/{1.73_M2}
POTASSIUM SERPL-SCNC: 4.4 MMOL/L (ref 3.7–5.3)
SODIUM BLD-SCNC: 139 MMOL/L (ref 135–144)

## 2021-05-28 PROCEDURE — 83036 HEMOGLOBIN GLYCOSYLATED A1C: CPT

## 2021-05-28 PROCEDURE — 80061 LIPID PANEL: CPT

## 2021-05-28 PROCEDURE — 36415 COLL VENOUS BLD VENIPUNCTURE: CPT

## 2021-05-28 PROCEDURE — 82565 ASSAY OF CREATININE: CPT

## 2021-05-28 PROCEDURE — 80051 ELECTROLYTE PANEL: CPT

## 2021-05-29 LAB
CHOLESTEROL/HDL RATIO: 2.7
CHOLESTEROL: 148 MG/DL
ESTIMATED AVERAGE GLUCOSE: 186 MG/DL
HBA1C MFR BLD: 8.1 % (ref 4–6)
HDLC SERPL-MCNC: 55 MG/DL
LDL CHOLESTEROL: 54 MG/DL (ref 0–130)
TRIGL SERPL-MCNC: 197 MG/DL
VLDLC SERPL CALC-MCNC: ABNORMAL MG/DL (ref 1–30)

## 2021-06-02 NOTE — PROGRESS NOTES
[address] [address]  Dept: [phone]  Dept Fax: [phone]    Scott Dutton is a 74 y.o. female who presents today for her medical conditions/complaints as noted below. Scott Dutton c/o of 6 Month Follow-Up (Hypertension, Diabetes- HGB A1c 2021= 8.2, Hyperlipidemia- Lipids 2021. She is not sure about getting the Covid vaccine. )      HPI:     HPI  Here for follow up of HTN, DM and Hyperlipidemia  Taking all medications regularly  No side effects noted    No other complaint currently  Hip pain in [facility] and not exercising as much recently. Walking 1 mile per day per pt now that hip improving. BP Readings from Last 3 Encounters:   21 138/76   20 120/78   20 112/78          (goal 120/80)    Past Medical History:   Diagnosis Date    Hyperlipidemia     Hypertension     Type 2 diabetes mellitus without complication (HCC)       Past Surgical History:   Procedure Laterality Date    COLONOSCOPY  10/2018    tubular adenoma    CYST REMOVAL      spinal cyst    KNEE SURGERY Right 2017    TUBAL LIGATION         Family History   Problem Relation Age of Onset    Diabetes Mother     Breast Cancer Mother     Cancer Mother 66        breast- , BCC lip-70    High Blood Pressure Mother     Diabetes Father     Heart Attack Father     Heart Attack Paternal Grandmother        Social History     Tobacco Use    Smoking status: Former Smoker     Packs/day: 0.25     Years: 5.00     Pack years: 1.25     Types: Cigarettes     Quit date:      Years since quittin.4    Smokeless tobacco: Never Used   Substance Use Topics    Alcohol use: No      Prior to Visit Medications    Medication Sig Taking?  Authorizing Provider   LEVEMIR FLEXTOUCH 100 UNIT/ML injection pen INJECT 30 UNITS SUBCUTANEOUSLY NIGHTLY  Kyung Hannah MD   metFORMIN (GLUCOPHAGE) 500 MG tablet TAKE 2 TABLETS 2 TIMES     DAILY WITH MEALS  Rodríguez GUTIÉRREZ Mich Duque MD   lisinopril (PRINIVIL;ZESTRIL) 20 MG tablet TAKE 1 & 1/2 (Denise Never) TABLETS BY MOUTH ONCE DAILY  Jeannie Keith MD   simvastatin (ZOCOR) 20 MG tablet Take 1 tablet by mouth nightly  Jeannie Keith MD   Semaglutide, 1 MG/DOSE, (OZEMPIC, 1 MG/DOSE,) 2 MG/1.5ML SOPN INJECT 1 MG Modesta Llanos MD   acarbose (PRECOSE) 25 MG tablet TAKE 1 TABLET BY MOUTH 2 TIMES DAILY (WITH MEALS)  Jeannie Keith MD   Insulin Pen Needle 31G X 4 MM MISC Daily with insulin injection  Jeannie Keith MD   BIOTIN PO Take by mouth  Historical Provider, MD   NIACIN PO Take by mouth  Historical Provider, MD   Cholecalciferol (VITAMIN D3) 5000 UNITS TABS Take 1 tablet by mouth daily  Historical Provider, MD   Multiple Vitamins-Minerals (THERAPEUTIC MULTIVITAMIN-MINERALS) tablet Take 1 tablet by mouth daily  Historical Provider, MD   vitamin B-12 (CYANOCOBALAMIN) 500 MCG tablet Take 500 mcg by mouth daily  Historical Provider, MD   aspirin 81 MG tablet Take 81 mg by mouth daily  Historical Provider, MD     Allergies   Allergen Reactions    Advil [Ibuprofen] Nausea Only    Aleve [Naproxen]      Other reaction(s): stomach upset    Farxiga [Dapagliflozin] Rash     Yeast infections commonly occuring       Health Maintenance   Topic Date Due    COVID-19 Vaccine (1) Never done    DTaP/Tdap/Td vaccine (2 - Td or Tdap) 03/15/2020    Diabetic microalbuminuria test  10/31/2020    Breast cancer screen  05/02/2021    Shingles Vaccine (1 of 2) 08/19/2022 (Originally 5/12/2003)    Diabetic foot exam  09/01/2021    Diabetic retinal exam  10/23/2021    A1C test (Diabetic or Prediabetic)  05/28/2022    Lipid screen  05/28/2022    Potassium monitoring  05/28/2022    Creatinine monitoring  05/28/2022    Colon cancer screen colonoscopy  10/02/2028    DEXA (modify frequency per FRAX score)  Completed    Flu vaccine  Completed    Pneumococcal 65+ years Vaccine  Completed    Hepatitis C screen  Completed    Hepatitis A vaccine  Aged Out    Hib vaccine  Aged Out    Meningococcal (ACWY) vaccine  Aged Out       Subjective:      Review of Systems   Constitutional: Negative for appetite change and fatigue. Patient reports she has trouble sleeping- she only sleeps about 4 hours a night. Abdominal feels hard- gives injections in abdomen   HENT: Negative for ear pain, sinus pressure and sinus pain. Eyes: Negative for visual disturbance. Respiratory: Negative for cough and shortness of breath. Cardiovascular: Negative for chest pain, palpitations and leg swelling. Gastrointestinal: Negative for constipation and diarrhea. Genitourinary: Negative for dysuria. Musculoskeletal: Positive for arthralgias. Right middle finger pain- stiffness. Skin: Negative for color change. Neurological: Positive for headaches. Negative for dizziness. Objective:     /76 (Site: Right Upper Arm, Position: Sitting, Cuff Size: Medium Adult)   Pulse 81   Temp 97.5 °F (36.4 °C)   Resp 16   Ht 5' 4\" (1.626 m)   Wt 174 lb 12.8 oz (79.3 kg)   SpO2 98%   BMI 30.00 kg/m²     Weight up 4 # from prior low in November    Physical Exam  Vitals reviewed. Constitutional:       General: She is not in acute distress. Appearance: She is well-developed. HENT:      Head: Normocephalic and atraumatic. Eyes:      Conjunctiva/sclera: Conjunctivae normal.   Neck:      Thyroid: No thyromegaly. Vascular: No carotid bruit. Cardiovascular:      Rate and Rhythm: Normal rate and regular rhythm. Heart sounds: No murmur heard. Pulmonary:      Effort: Pulmonary effort is normal.      Breath sounds: Normal breath sounds. Abdominal:      General: Bowel sounds are normal.      Palpations: Abdomen is soft. Musculoskeletal:         General: No swelling (BLE). Cervical back: Neck supple.    Skin:     Comments: tan   Neurological:      Mental Status: She is alert and oriented to person, place, and time.       Lab Results   Component Value Date     05/28/2021    K 4.4 05/28/2021     05/28/2021    CO2 28 05/28/2021     Lab Results   Component Value Date    CREATININE 0.69 05/28/2021     Lab Results   Component Value Date    CHOL 148 05/28/2021    CHOL 158 10/31/2019    CHOL 176 03/10/2018     Lab Results   Component Value Date    TRIG 197 (H) 05/28/2021    TRIG 172 10/31/2019    TRIG 151 03/10/2018     Lab Results   Component Value Date    HDL 55 05/28/2021     Lab Results   Component Value Date    LDLCHOLESTEROL 54 05/28/2021    LDLCALC 63.6 10/31/2019    LDLCALC 87.8 03/10/2018     Lab Results   Component Value Date    VLDL NOT REPORTED (H) 05/28/2021    VLDL 34 10/31/2019    VLDL 30 03/10/2018     Lab Results   Component Value Date    CHOLHDLRATIO 2.7 05/28/2021    CHOLHDLRATIO 2.6 10/31/2019    CHOLHDLRATIO 3.0 03/10/2018     Lab Results   Component Value Date    LABA1C 8.1 (H) 05/28/2021     Lab Results   Component Value Date     05/28/2021     The 10-year ASCVD risk score ([address] et al., 2013) is: 19.4%    Values used to calculate the score:      Age: 74 years      Sex: Female      Is Non- : No      Diabetic: Yes      Tobacco smoker: No      Systolic Blood Pressure: 093 mmHg      Is BP treated: Yes      HDL Cholesterol: 55 mg/dL      Total Cholesterol: 148 mg/dL    Assessment:     1. Type 2 diabetes mellitus without complication, without long-term current use of insulin ([facility] [address]    2. Essential hypertension    3. Mixed hyperlipidemia    4. Lipoprotein deficiency    5. Screening mammogram, encounter for      No results found for this visit on 06/03/21.         Plan:     Orders Placed This Encounter   Procedures    DANYELLE DIGITAL SCREEN W OR WO CAD BILATERAL     Standing Status:   Future     Standing Expiration Date:   6/3/2022     Order Specific Question:   Reason for exam:     Answer:   screening           Return in about 5 months (around 11/1/2021) for DM, HTN, lipid. Patient Instructions   Encourage covid vaccine when desired  Due for Tdap booster when able  Need for mammogram screening reviewed  Consider adding exercise bike  Sunscreen encouraged >30 with sun exposure. Discussed use, benefit, and side effects of prescribed medications. All patient questions answered. Pt voiced understanding. Reviewed health maintenance-mammogram, covid reviewed, Tdap encouraged. Instructed to continue current medications, diet and exercise. Patient agreed with treatment plan. Follow up as directed.      Electronically signed by Saima José MD on 6/3/2021

## 2021-06-03 ENCOUNTER — OFFICE VISIT (OUTPATIENT)
Dept: FAMILY MEDICINE CLINIC | Age: 66
End: 2021-06-03
Payer: MEDICARE

## 2021-06-03 VITALS
TEMPERATURE: 97.5 F | BODY MASS INDEX: 29.84 KG/M2 | OXYGEN SATURATION: 98 % | DIASTOLIC BLOOD PRESSURE: 76 MMHG | WEIGHT: 174.8 LBS | HEART RATE: 81 BPM | HEIGHT: 64 IN | SYSTOLIC BLOOD PRESSURE: 138 MMHG | RESPIRATION RATE: 16 BRPM

## 2021-06-03 DIAGNOSIS — E11.9 TYPE 2 DIABETES MELLITUS WITHOUT COMPLICATION, WITHOUT LONG-TERM CURRENT USE OF INSULIN (HCC): Primary | ICD-10-CM

## 2021-06-03 DIAGNOSIS — E78.2 MIXED HYPERLIPIDEMIA: ICD-10-CM

## 2021-06-03 DIAGNOSIS — Z12.31 SCREENING MAMMOGRAM, ENCOUNTER FOR: ICD-10-CM

## 2021-06-03 DIAGNOSIS — I10 ESSENTIAL HYPERTENSION: ICD-10-CM

## 2021-06-03 DIAGNOSIS — E78.6 LIPOPROTEIN DEFICIENCY: ICD-10-CM

## 2021-06-03 PROCEDURE — G8417 CALC BMI ABV UP PARAM F/U: HCPCS | Performed by: FAMILY MEDICINE

## 2021-06-03 PROCEDURE — 1123F ACP DISCUSS/DSCN MKR DOCD: CPT | Performed by: FAMILY MEDICINE

## 2021-06-03 PROCEDURE — 1036F TOBACCO NON-USER: CPT | Performed by: FAMILY MEDICINE

## 2021-06-03 PROCEDURE — 2022F DILAT RTA XM EVC RTNOPTHY: CPT | Performed by: FAMILY MEDICINE

## 2021-06-03 PROCEDURE — 99214 OFFICE O/P EST MOD 30 MIN: CPT | Performed by: FAMILY MEDICINE

## 2021-06-03 PROCEDURE — G8427 DOCREV CUR MEDS BY ELIG CLIN: HCPCS | Performed by: FAMILY MEDICINE

## 2021-06-03 PROCEDURE — 4040F PNEUMOC VAC/ADMIN/RCVD: CPT | Performed by: FAMILY MEDICINE

## 2021-06-03 PROCEDURE — 99212 OFFICE O/P EST SF 10 MIN: CPT | Performed by: FAMILY MEDICINE

## 2021-06-03 PROCEDURE — 3052F HG A1C>EQUAL 8.0%<EQUAL 9.0%: CPT | Performed by: FAMILY MEDICINE

## 2021-06-03 PROCEDURE — 1090F PRES/ABSN URINE INCON ASSESS: CPT | Performed by: FAMILY MEDICINE

## 2021-06-03 PROCEDURE — G8399 PT W/DXA RESULTS DOCUMENT: HCPCS | Performed by: FAMILY MEDICINE

## 2021-06-03 PROCEDURE — 3017F COLORECTAL CA SCREEN DOC REV: CPT | Performed by: FAMILY MEDICINE

## 2021-06-03 RX ORDER — INSULIN DETEMIR 100 [IU]/ML
40 INJECTION, SOLUTION SUBCUTANEOUS NIGHTLY
Qty: 15 ML | Refills: 3 | Status: SHIPPED | OUTPATIENT
Start: 2021-06-03

## 2021-06-03 RX ORDER — AMPICILLIN TRIHYDRATE 250 MG
CAPSULE ORAL
COMMUNITY

## 2021-06-03 RX ORDER — MULTIVIT WITH MINERALS/LUTEIN
250 TABLET ORAL DAILY
COMMUNITY

## 2021-06-03 SDOH — ECONOMIC STABILITY: FOOD INSECURITY: WITHIN THE PAST 12 MONTHS, THE FOOD YOU BOUGHT JUST DIDN'T LAST AND YOU DIDN'T HAVE MONEY TO GET MORE.: NEVER TRUE

## 2021-06-03 SDOH — ECONOMIC STABILITY: FOOD INSECURITY: WITHIN THE PAST 12 MONTHS, YOU WORRIED THAT YOUR FOOD WOULD RUN OUT BEFORE YOU GOT MONEY TO BUY MORE.: NEVER TRUE

## 2021-06-03 ASSESSMENT — ENCOUNTER SYMPTOMS
COUGH: 0
SHORTNESS OF BREATH: 0
COLOR CHANGE: 0
CONSTIPATION: 0
SINUS PRESSURE: 0
SINUS PAIN: 0
DIARRHEA: 0

## 2021-06-03 ASSESSMENT — SOCIAL DETERMINANTS OF HEALTH (SDOH): HOW HARD IS IT FOR YOU TO PAY FOR THE VERY BASICS LIKE FOOD, HOUSING, MEDICAL CARE, AND HEATING?: SOMEWHAT HARD

## 2021-06-03 ASSESSMENT — PATIENT HEALTH QUESTIONNAIRE - PHQ9
SUM OF ALL RESPONSES TO PHQ QUESTIONS 1-9: 1
2. FEELING DOWN, DEPRESSED OR HOPELESS: 1
1. LITTLE INTEREST OR PLEASURE IN DOING THINGS: 0
SUM OF ALL RESPONSES TO PHQ QUESTIONS 1-9: 1
SUM OF ALL RESPONSES TO PHQ QUESTIONS 1-9: 1
SUM OF ALL RESPONSES TO PHQ9 QUESTIONS 1 & 2: 1

## 2021-06-03 NOTE — PATIENT INSTRUCTIONS
Encourage covid vaccine when desired  Due for Tdap booster when able  Need for mammogram screening reviewed  Consider adding exercise bike  Sunscreen encouraged >30 with sun exposure.

## 2021-10-18 DIAGNOSIS — E11.9 TYPE 2 DIABETES MELLITUS WITHOUT COMPLICATION, WITHOUT LONG-TERM CURRENT USE OF INSULIN (HCC): ICD-10-CM

## 2021-10-18 NOTE — TELEPHONE ENCOUNTER
Anmol Win is requesting a refill on the following medication(s):  Requested Prescriptions     Pending Prescriptions Disp Refills    metFORMIN (GLUCOPHAGE) 500 MG tablet 360 tablet 1     Sig: TAKE 2 TABLETS 2 TIMES     DAILY WITH MEALS       Last Visit Date (If Applicable):  0/0/0005    Next Visit Date:    11/4/2021

## 2023-01-11 NOTE — TELEPHONE ENCOUNTER
I called and spoke with the patient and informed her of this, she scheduled an appointment to see Dr. Forte on the 25th.   Pt called stating that the new 5mg tradjenta is not working that her blood sugars are in the 190s. Wants to know if she should be back on the 100mg [place] and [place] or a higher mg of tradjenta. Please advise.